# Patient Record
Sex: MALE | Race: BLACK OR AFRICAN AMERICAN
[De-identification: names, ages, dates, MRNs, and addresses within clinical notes are randomized per-mention and may not be internally consistent; named-entity substitution may affect disease eponyms.]

---

## 2017-10-24 NOTE — XCELER[facility] REPORT
[address] [address]

                             Tel: [phone]

                             Fax: [phone]



                    Transthoracic Echocardiogram Report

____________________________________________________________________________



Name: SERENITY DIAZ

MRN: [id number]                Age: 82 yrs

Gender: Male                   : 1935

Patient Status: Outpatient     Patient Location: 

Account #: [id number]

Study Date: 10/23/2017 09:46 AM

Accession #: [id number]

____________________________________________________________________________



Height: 65 in        Weight: 171 lb        BSA: 1.9 m2



____________________________________________________________________________

Procedure: A complete two-dimensional transthoracic echocardiogram was

performed (2D, M-mode, spectral and color flow Doppler). The study was

technically adequate with some images being suboptimal in quality.

Reason For Study: RBBB





Ordering Physician: MONICA URIBE

Performed By: Teresa Thompson

____________________________________________________________________________





Interpretation Summary

The left ventricular ejection fraction is normal.

There is normal left ventricular wall thickness.

The left ventricle is grossly normal size.

Doppler measurements suggest pseudonormalized left ventricular relaxation,

which is associated with grade II/IV or mild to moderate diastolic

dysfunction

The right ventricular systolic function is normal.

The right atrium is normal in size

The left atrial size is normal.

There is a trace to mild amount of mitral regurgitation

There is no mitral valve stenosis.

No aortic regurgitation is present.

There is no aortic valve stenosis

There is a trace or physiologic amount of tricuspid regurgitation

Tricuspid regurgitation jet envelope not well defined to measure RV

systolic pressure accurately.

The aortic root is not well visualized.

The inferior vena cava was not well visualized

There is no pericardial effusion.



____________________________________________________________________________



MMode/2D Measurements & Calculations

RVDd: 3.2 cm  LVIDd: 4.7 cm   FS: 35.8 %            Ao root diam: 2.8 cm

IVSd: 0.86 cm LVIDs: 3.0 cm   EDV(Teich): 103.6 ml

              LVPWd: 0.89 cm  ESV(Teich): 36.0 ml   Ao root area: 6.1 cm2

                              EF(Teich): 65.3 %



Doppler Measurements & Calculations

MV E max vanessa:      MV dec slope:       Ao V2 max:         LV V1 max P.5 cm/sec                            113.7 cm/sec       2.6 mmHg

MV A max vanessa:      202.2 cm/sec2       Ao max PG:         LV V1 max:

72.2 cm/sec        MV dec time:        5.2 mmHg           80.6 cm/sec

MV E/A: 0.60       0.22 sec



        _____________________________________________________________

PA V2 max:         PI end-d vanessa:       TR max vanessa:

78.2 cm/sec        100.0 cm/sec        215.2 cm/sec

PA max PG:                             TR max P.4 mmHg                               18.8 mmHg



____________________________________________________________________________

Left Ventricle

The left ventricle is grossly normal size. There is normal left ventricular

wall thickness. The left ventricular ejection fraction is normal. Doppler

measurements suggest pseudonormalized left ventricular relaxation, which is

associated with grade II/IV or mild to moderate diastolic dysfunction.



Right Ventricle

Borderline right ventricular enlargement. There is normal right ventricular

wall thickness. The right ventricular systolic function is normal.



Atria

The right atrium is normal in size. The left atrial size is normal.

Interarterial septum not well visualized and not well dopplered. Cannot

comment on ASD/PFO presence.





Mitral Valve

The mitral valve is grossly normal. There is no mitral valve stenosis.

There is a trace to mild amount of mitral regurgitation.



Aortic Valve

The aortic valve is grossly normal. There is no aortic valve stenosis. No

aortic regurgitation is present.



Tricuspid Valve

The tricuspid valve is not well visualized, but is grossly normal. There is

no tricuspid stenosis. There is a trace or physiologic amount of tricuspid

regurgitation. Tricuspid regurgitation jet envelope not well defined to

measure RV systolic pressure accurately.



Pulmonic Valve

The pulmonic valve is not well visualized.



Great Vessels

The aortic root is not well visualized. The inferior vena cava was not well

visualized.





Effusions

There is no pericardial effusion.



____________________________________________________________________________



Electronically signed by:      Betty Chavez      on 10/24/2017 08:56 AM



CC: MONICA URIBE

>

Betty Chavez

## 2020-10-22 NOTE — RADIOLOGY REPORT (SQ)
EXAM DESCRIPTION:  HIP LEFT AP/LATERAL



IMAGES COMPLETED DATE/TIME:  10/22/2020 4:24 pm



REASON FOR STUDY:  M25.559 PAIN IN UNSPECIFIED HIP M25.559  PAIN IN UNSPECIFIED HIP M54.10  RADICULOP
ATHY, SITE UNSPECIFIED



COMPARISON:  None.



NUMBER OF VIEWS:  Two views.



TECHNIQUE:  AP pelvis and additional frog legview of the left hip.



LIMITATIONS:  None.



FINDINGS:  MINERALIZATION: Normal.

LEFT HIP: No fracture or dislocation.  No worrisome bone lesions. No contour deformity.  No joint spa
ce narrowing.  Sclerosis with small osteophytes.

RIGHT HIP: No fracture or dislocation.  No worrisome bone lesions.  Sclerosis with small osteophytes.
  Limited views.

PUBIS AND ISCHIUM: No fracture.

PELVIS: No fracture.

SACRUM: No fracture or dislocation. No worrisome bone lesions.

LOWER LUMBAR SPINE: No fracture or dislocation. No worrisome bone lesions.  No significant disc disea
se.

SOFT TISSUES: No findings.

OTHER: No other significant finding.



IMPRESSION:  MILD DEGENERATIVE CHANGES.  NO ACUTE FINDINGS.



TECHNICAL DOCUMENTATION:  JOB ID:  [id number]

 2011 [employer]- All Rights Reserved



Reading location - IP/workstation name: [email or web address]

## 2020-10-22 NOTE — RADIOLOGY REPORT (SQ)
EXAM DESCRIPTION:  L SPINE WHOLE



IMAGES COMPLETED DATE/TIME:  10/22/2020 4:24 pm



REASON FOR STUDY:  M54.10 RADICULOPATHY, SITE UNSPECIFIED M25.559  PAIN IN UNSPECIFIED HIP M54.10  RA
DICULOPATHY, SITE UNSPECIFIED



COMPARISON:  None.



NUMBER OF VIEWS:  Five views including obliques.



TECHNIQUE:  AP, lateral, oblique, and sacral radiographic images acquired of the lumbar spine.



LIMITATIONS:  None.



FINDINGS:  MINERALIZATION: Normal.

SEGMENTATION: Normal.  No transitional anatomy.

ALIGNMENT: Normal.

VERTEBRAE: Maintained height.  No fracture or worrisome bone lesion.

DISCS: Multilevel disc space narrowing with osteophytes.

POSTERIOR ELEMENTS: Pedicles and facets are intact.  No pars defect or posterior arch defects. Facet 
arthropathy is present.

HARDWARE: None in the spine.

PARASPINAL SOFT TISSUES: Normal.

PELVIS: Intact as visualized. No fractures or worrisome bone lesions. SI joints intact.

OTHER: No other significant finding.



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



TECHNICAL DOCUMENTATION:  JOB ID:  [id number]

 2011 [employer]- All Rights Reserved



Reading location - IP/workstation name: [email or web address]

## 2020-12-25 ENCOUNTER — HOSPITAL ENCOUNTER (INPATIENT)
Dept: HOSPITAL 62 - ER | Age: 85
LOS: 13 days | DRG: 208 | End: 2021-01-07
Attending: HOSPITALIST | Admitting: HOSPITALIST
Payer: MEDICARE

## 2020-12-25 DIAGNOSIS — Z79.84: ICD-10-CM

## 2020-12-25 DIAGNOSIS — J12.82: ICD-10-CM

## 2020-12-25 DIAGNOSIS — J96.02: ICD-10-CM

## 2020-12-25 DIAGNOSIS — J96.91: ICD-10-CM

## 2020-12-25 DIAGNOSIS — R65.21: ICD-10-CM

## 2020-12-25 DIAGNOSIS — K72.00: ICD-10-CM

## 2020-12-25 DIAGNOSIS — I48.91: ICD-10-CM

## 2020-12-25 DIAGNOSIS — Z88.0: ICD-10-CM

## 2020-12-25 DIAGNOSIS — Z78.1: ICD-10-CM

## 2020-12-25 DIAGNOSIS — Z79.890: ICD-10-CM

## 2020-12-25 DIAGNOSIS — U07.1: Primary | ICD-10-CM

## 2020-12-25 DIAGNOSIS — A41.89: ICD-10-CM

## 2020-12-25 DIAGNOSIS — E11.9: ICD-10-CM

## 2020-12-25 DIAGNOSIS — Z87.891: ICD-10-CM

## 2020-12-25 DIAGNOSIS — D69.6: ICD-10-CM

## 2020-12-25 DIAGNOSIS — N17.9: ICD-10-CM

## 2020-12-25 DIAGNOSIS — M19.90: ICD-10-CM

## 2020-12-25 DIAGNOSIS — E03.9: ICD-10-CM

## 2020-12-25 LAB
ADD MANUAL DIFF: NO
ALBUMIN SERPL-MCNC: 4.1 G/DL (ref 3.5–5)
ALP SERPL-CCNC: 80 U/L (ref 38–126)
ANION GAP SERPL CALC-SCNC: 10 MMOL/L (ref 5–19)
APPEARANCE UR: CLEAR
APTT PPP: YELLOW S
ARTERIAL BLOOD FIO2: (no result)
ARTERIAL BLOOD H2CO3: 1.03 MMOL/L (ref 1.05–1.35)
ARTERIAL BLOOD HCO3: 22.1 MMOL/L (ref 20–24)
ARTERIAL BLOOD PCO2: 34.1 MMHG (ref 35–45)
ARTERIAL BLOOD PH: 7.43 (ref 7.35–7.45)
ARTERIAL BLOOD PO2: 51.4 MMHG (ref 80–100)
ARTERIAL BLOOD TOTAL CO2: 23.1 MMOL/L (ref 23–27)
AST SERPL-CCNC: 70 U/L (ref 17–59)
BASE EXCESS BLDA CALC-SCNC: -1.6 MMOL/L
BASE EXCESS BLDV CALC-SCNC: -0.6 MMOL/L
BASOPHILS # BLD AUTO: 0 10^3/UL (ref 0–0.2)
BASOPHILS NFR BLD AUTO: 0.4 % (ref 0–2)
BILIRUB DIRECT SERPL-MCNC: 0.1 MG/DL (ref 0–0.4)
BILIRUB SERPL-MCNC: 1.2 MG/DL (ref 0.2–1.3)
BILIRUB UR QL STRIP: NEGATIVE
BUN SERPL-MCNC: 23 MG/DL (ref 7–20)
CALCIUM: 8.8 MG/DL (ref 8.4–10.2)
CHLORIDE SERPL-SCNC: 97 MMOL/L (ref 98–107)
CO2 SERPL-SCNC: 26 MMOL/L (ref 22–30)
EOSINOPHIL # BLD AUTO: 0 10^3/UL (ref 0–0.6)
EOSINOPHIL NFR BLD AUTO: 0 % (ref 0–6)
ERYTHROCYTE [DISTWIDTH] IN BLOOD BY AUTOMATED COUNT: 12.2 % (ref 11.5–14)
FERRITIN SERPL-MCNC: 1050 NG/ML (ref 17.9–464)
GLUCOSE SERPL-MCNC: 140 MG/DL (ref 75–110)
GLUCOSE UR STRIP-MCNC: NEGATIVE MG/DL
HCO3 BLDV-SCNC: 24.6 MMOL/L (ref 20–32)
HCT VFR BLD CALC: 39.9 % (ref 37.9–51)
HGB BLD-MCNC: 12.8 G/DL (ref 13.5–17)
KETONES UR STRIP-MCNC: NEGATIVE MG/DL
LYMPHOCYTES # BLD AUTO: 1.1 10^3/UL (ref 0.5–4.7)
LYMPHOCYTES NFR BLD AUTO: 11.8 % (ref 13–45)
MCH RBC QN AUTO: 28.8 PG (ref 27–33.4)
MCHC RBC AUTO-ENTMCNC: 32.1 G/DL (ref 32–36)
MCV RBC AUTO: 90 FL (ref 80–97)
MONOCYTES # BLD AUTO: 0.9 10^3/UL (ref 0.1–1.4)
MONOCYTES NFR BLD AUTO: 9.2 % (ref 3–13)
NEUTROPHILS # BLD AUTO: 7.4 10^3/UL (ref 1.7–8.2)
NEUTS SEG NFR BLD AUTO: 78.6 % (ref 42–78)
NITRITE UR QL STRIP: NEGATIVE
PCO2 BLDV: 42.6 MMHG (ref 35–63)
PH BLDV: 7.38 [PH] (ref 7.3–7.42)
PH UR STRIP: 6 [PH] (ref 5–9)
PLATELET # BLD: 230 10^3/UL (ref 150–450)
POTASSIUM SERPL-SCNC: 4.4 MMOL/L (ref 3.6–5)
PROT SERPL-MCNC: 7.8 G/DL (ref 6.3–8.2)
PROT UR STRIP-MCNC: 30 MG/DL
RBC # BLD AUTO: 4.44 10^6/UL (ref 4.35–5.55)
SAO2 % BLDA: 87.7 % (ref 94–98)
SP GR UR STRIP: 1.01
TOTAL CELLS COUNTED % (AUTO): 100 %
UROBILINOGEN UR-MCNC: NEGATIVE MG/DL (ref ?–2)
WBC # BLD AUTO: 9.4 10^3/UL (ref 4–10.5)

## 2020-12-25 PROCEDURE — 87070 CULTURE OTHR SPECIMN AEROBIC: CPT

## 2020-12-25 PROCEDURE — 86140 C-REACTIVE PROTEIN: CPT

## 2020-12-25 PROCEDURE — 99285 EMERGENCY DEPT VISIT HI MDM: CPT

## 2020-12-25 PROCEDURE — 71045 X-RAY EXAM CHEST 1 VIEW: CPT

## 2020-12-25 PROCEDURE — C9113 INJ PANTOPRAZOLE SODIUM, VIA: HCPCS

## 2020-12-25 PROCEDURE — 94660 CPAP INITIATION&MGMT: CPT

## 2020-12-25 PROCEDURE — 36573 INSJ PICC RS&I 5 YR+: CPT

## 2020-12-25 PROCEDURE — 84484 ASSAY OF TROPONIN QUANT: CPT

## 2020-12-25 PROCEDURE — 87205 SMEAR GRAM STAIN: CPT

## 2020-12-25 PROCEDURE — 87150 DNA/RNA AMPLIFIED PROBE: CPT

## 2020-12-25 PROCEDURE — 87186 SC STD MICRODIL/AGAR DIL: CPT

## 2020-12-25 PROCEDURE — 83615 LACTATE (LD) (LDH) ENZYME: CPT

## 2020-12-25 PROCEDURE — 84134 ASSAY OF PREALBUMIN: CPT

## 2020-12-25 PROCEDURE — 82803 BLOOD GASES ANY COMBINATION: CPT

## 2020-12-25 PROCEDURE — 85384 FIBRINOGEN ACTIVITY: CPT

## 2020-12-25 PROCEDURE — 92950 HEART/LUNG RESUSCITATION CPR: CPT

## 2020-12-25 PROCEDURE — 82962 GLUCOSE BLOOD TEST: CPT

## 2020-12-25 PROCEDURE — 84478 ASSAY OF TRIGLYCERIDES: CPT

## 2020-12-25 PROCEDURE — C9803 HOPD COVID-19 SPEC COLLECT: HCPCS

## 2020-12-25 PROCEDURE — 81001 URINALYSIS AUTO W/SCOPE: CPT

## 2020-12-25 PROCEDURE — 99233 SBSQ HOSP IP/OBS HIGH 50: CPT

## 2020-12-25 PROCEDURE — 80048 BASIC METABOLIC PNL TOTAL CA: CPT

## 2020-12-25 PROCEDURE — 93005 ELECTROCARDIOGRAM TRACING: CPT

## 2020-12-25 PROCEDURE — 82570 ASSAY OF URINE CREATININE: CPT

## 2020-12-25 PROCEDURE — 85025 COMPLETE CBC W/AUTO DIFF WBC: CPT

## 2020-12-25 PROCEDURE — 94002 VENT MGMT INPAT INIT DAY: CPT

## 2020-12-25 PROCEDURE — 80053 COMPREHEN METABOLIC PANEL: CPT

## 2020-12-25 PROCEDURE — 85610 PROTHROMBIN TIME: CPT

## 2020-12-25 PROCEDURE — 85730 THROMBOPLASTIN TIME PARTIAL: CPT

## 2020-12-25 PROCEDURE — 93010 ELECTROCARDIOGRAM REPORT: CPT

## 2020-12-25 PROCEDURE — 85652 RBC SED RATE AUTOMATED: CPT

## 2020-12-25 PROCEDURE — 83880 ASSAY OF NATRIURETIC PEPTIDE: CPT

## 2020-12-25 PROCEDURE — 85379 FIBRIN DEGRADATION QUANT: CPT

## 2020-12-25 PROCEDURE — 80061 LIPID PANEL: CPT

## 2020-12-25 PROCEDURE — 36620 INSERTION CATHETER ARTERY: CPT

## 2020-12-25 PROCEDURE — 36600 WITHDRAWAL OF ARTERIAL BLOOD: CPT

## 2020-12-25 PROCEDURE — 87040 BLOOD CULTURE FOR BACTERIA: CPT

## 2020-12-25 PROCEDURE — 96361 HYDRATE IV INFUSION ADD-ON: CPT

## 2020-12-25 PROCEDURE — 76937 US GUIDE VASCULAR ACCESS: CPT

## 2020-12-25 PROCEDURE — 77001 FLUOROGUIDE FOR VEIN DEVICE: CPT

## 2020-12-25 PROCEDURE — 87086 URINE CULTURE/COLONY COUNT: CPT

## 2020-12-25 PROCEDURE — 99291 CRITICAL CARE FIRST HOUR: CPT

## 2020-12-25 PROCEDURE — 94003 VENT MGMT INPAT SUBQ DAY: CPT

## 2020-12-25 PROCEDURE — P9047 ALBUMIN (HUMAN), 25%, 50ML: HCPCS

## 2020-12-25 PROCEDURE — 87077 CULTURE AEROBIC IDENTIFY: CPT

## 2020-12-25 PROCEDURE — 31500 INSERT EMERGENCY AIRWAY: CPT

## 2020-12-25 PROCEDURE — 96374 THER/PROPH/DIAG INJ IV PUSH: CPT

## 2020-12-25 PROCEDURE — 84100 ASSAY OF PHOSPHORUS: CPT

## 2020-12-25 PROCEDURE — 83735 ASSAY OF MAGNESIUM: CPT

## 2020-12-25 PROCEDURE — 85027 COMPLETE CBC AUTOMATED: CPT

## 2020-12-25 PROCEDURE — 83605 ASSAY OF LACTIC ACID: CPT

## 2020-12-25 PROCEDURE — 82728 ASSAY OF FERRITIN: CPT

## 2020-12-25 PROCEDURE — 84300 ASSAY OF URINE SODIUM: CPT

## 2020-12-25 PROCEDURE — 36415 COLL VENOUS BLD VENIPUNCTURE: CPT

## 2020-12-25 RX ADMIN — VITAMIN D, TAB 1000IU (100/BT) SCH UNIT: 25 TAB at 22:48

## 2020-12-25 RX ADMIN — Medication SCH MG: at 22:47

## 2020-12-25 RX ADMIN — Medication SCH MG: at 22:46

## 2020-12-25 RX ADMIN — INSULIN LISPRO SCH UNIT: 100 INJECTION, SOLUTION INTRAVENOUS; SUBCUTANEOUS at 23:04

## 2020-12-25 RX ADMIN — Medication SCH TAB: at 23:23

## 2020-12-25 NOTE — ER DOCUMENT REPORT
ED Respiratory Problem





- General


Chief Complaint: Shortness Of Breath


Stated Complaint: SHORT OF BREATH


Time Seen by Provider: 12/25/20 15:32


Mode of Arrival: Medic


Information source: Patient


Notes: 





85-year-old man presenting to the emergency department history of diagnosed with

coronavirus on Tuesday of this past week 12/22/2020.  He complains of increasing

weakness over the past week along with loss of smell and taste.  He is a poor 

intake as well as increasing cough and over the past 24 hours shortness of 

breath.  His granddaughter is also positive for coronavirus.  History of diabet

es mellitus


TRAVEL OUTSIDE OF THE [place] IN LAST 30 DAYS: No





- Related Data


Allergies/Adverse Reactions: 


                                        





Penicillins Allergy (Intermediate, Verified 12/28/17 14:33)


   Hives











Past Medical History





- General


Information source: Patient





- Social History


Smoking Status: Unknown if Ever Smoked


Family History: Reviewed & Not Pertinent





- Past Medical History


Cardiac Medical History: 


   Denies: Hx Heart Attack, Hx Hypertension


Pulmonary Medical History: 


   Denies: Hx Asthma


Neurological Medical History: Denies: Hx Cerebrovascular Accident, Hx Seizures


GI Medical History: Denies: Hx Hepatitis, Hx Hiatal Hernia, Hx Ulcer


Infectious Medical History: Denies: Hx Hepatitis


Past Surgical History: Denies: Hx Open Heart Surgery, Hx Pacemaker





Review of Systems





- Review of Systems


Notes: 


Constitutional: Negative for fever.


HENT: Negative for sore throat.


Eyes: Negative for visual changes.


Cardiovascular: See HPI


Respiratory: Negative for shortness of breath.


Gastrointestinal: Negative for abdominal pain, vomiting or diarrhea.


Genitourinary: Negative for dysuria.


Musculoskeletal: Negative for back pain.


Skin: Negative for rash.


Neurological: Negative for headaches, weakness or numbness.





10 point ROS negative except as marked above and in HPI.








Physical Exam





- Vital signs


Vitals: 


                                        











Pulse Ox


 


 86 L


 


 12/25/20 15:03














- Notes


Notes: 





PHYSICAL EXAMINATION:


 


Physical Exam:


General: Well-nourished well-developed 85-year-old male in with shortness of 

breath


HEENT: NC/AT, pupils equal round and reactive to light, MM moist,nares clear, 

oropharynx clear, airway patent


Neck: supple, no adenopathy, no masses.  Good range of motion


Lungs: clear, no wheezing, no rales no rhonchi


CVS: Regular rate and rhythm no murmur gallop or rub


Abdomen: Soft, active, nontender, no masses, no hepatosplenomegaly


Ext:   No edema, clubbing or cyanosis.


Neuro: Alert and responsive, moving all 4 extremities on command, cranial nerves

intact, no focal findings


Skin: Intact no open lesions, no rash











Course





- Re-evaluation


Re-evalutation: 





12/25/20 18:17


Patient with known coronavirus 19 positive outpatient tests Tuesday, 12/22/2020.

 Presents with pneumonia and hypoxia.  He is being admitted to the hospital, jamey day has an allergy to penicillin states he had a rash many years ago.  I am 

giving him ceftriaxone 1 g IV along with Zithromax 100 mg IV and Decadron 6 mg 

IV.  I have discussed the patient with the hospitalist and Dr. Gonzales will 

await the patient to the hospital for further evaluation and treatment.





- Vital Signs


Vital signs: 


                                        











Temp Pulse Resp BP Pulse Ox


 


 101.1 F H     23 H  134/79 H  91 L


 


 12/25/20 16:00     12/25/20 16:00  12/25/20 16:00  12/25/20 16:00














- Laboratory Results


Result Diagrams: 


                                 12/25/20 15:16





                                 12/25/20 15:16


Laboratory Results Interpreted: 


                                        











  12/25/20 12/25/20 12/25/20





  15:16 15:16 15:20


 


Hgb  12.8 L  


 


Lymph % (Auto)  11.8 L  


 


Seg Neutrophils %  78.6 H  


 


ESR   


 


D-Dimer   


 


Carbonic Acid    1.03 L


 


ABG pCO2    34.1 L


 


ABG pO2    51.4 L


 


ABG O2 Saturation    87.7 L


 


Sodium   132.7 L 


 


Chloride   97 L 


 


BUN   23 H 


 


Creatinine   1.50 H 


 


Est GFR ( Amer)   54 L 


 


Est GFR (MDRD) Non-Af   45 L 


 


Glucose   140 H 


 


Ferritin   


 


AST   70 H 


 


Lactate Dehydrogenase   


 


C-Reactive Protein   


 


Urine Protein   


 


Urine Ascorbic Acid   














  12/25/20 12/25/20 12/25/20





  15:43 15:43 15:43


 


Hgb   


 


Lymph % (Auto)   


 


Seg Neutrophils %   


 


ESR  60 H  


 


D-Dimer    1.44 H


 


Carbonic Acid   


 


ABG pCO2   


 


ABG pO2   


 


ABG O2 Saturation   


 


Sodium   


 


Chloride   


 


BUN   


 


Creatinine   


 


Est GFR ( Amer)   


 


Est GFR (MDRD) Non-Af   


 


Glucose   


 


Ferritin   


 


AST   


 


Lactate Dehydrogenase   


 


C-Reactive Protein   36.7 H 


 


Urine Protein   


 


Urine Ascorbic Acid   














  12/25/20 12/25/20





  15:43 16:30


 


Hgb  


 


Lymph % (Auto)  


 


Seg Neutrophils %  


 


ESR  


 


D-Dimer  


 


Carbonic Acid  


 


ABG pCO2  


 


ABG pO2  


 


ABG O2 Saturation  


 


Sodium  


 


Chloride  


 


BUN  


 


Creatinine  


 


Est GFR ( Amer)  


 


Est GFR (MDRD) Non-Af  


 


Glucose  


 


Ferritin  1050.00 H 


 


AST  


 


Lactate Dehydrogenase  467 H 


 


C-Reactive Protein  


 


Urine Protein   30 H


 


Urine Ascorbic Acid   40 H








I have reviewed laboratory data and used this information for the treatment 

decisions regarding the patient.





Critical Laboratory Results Reviewed: No Critical Results





- Radiology Results


Radiology Results Interpreted: 





12/25/20 17:55





                                        





Chest X-Ray  12/25/20 15:03


IMPRESSION:  Cannot exclude a limited left lower lobe pneumonia.


 











Critical Radiology Results Reviewed: No Critical Results





- EKG Interpretation by Me


Rate: Normal - EKG interpreted by Dr. Villanueva: Normal sinus rhythm, rate 99, HI 

interval 124 ms QT interval 368 ms, left axis deviation, right bundle branch 

block, no acute ST or T wave abnormalities, , there is no prior EKG for 

comparison.  Interpretation: Abnormal EKG





Discharge





- Discharge


Clinical Impression: 


 SARS-associated coronavirus infection, Hypoxia





Pneumonia


Qualifiers:


 Pneumonia type: due to unspecified organism Laterality: left Lung location: 

lower lobe of lung Qualified Code(s): J18.9 - Pneumonia, unspecified organism





Condition: Good


Disposition: ADMITTED AS INPATIENT


Admitting Provider: Janet (Hospitalist)


Unit Admitted: [facility]

## 2020-12-25 NOTE — EKG REPORT
SEVERITY:- ABNORMAL ECG -

SINUS RHYTHM

RIGHT BUNDLE BRANCH BLOCK

:

Confirmed by: Patrick Stone MD 25-Dec-2020 21:49:19

## 2020-12-25 NOTE — RADIOLOGY REPORT (SQ)
EXAM DESCRIPTION:  CHEST SINGLE VIEW



IMAGES COMPLETED DATE/TIME:  12/25/2020 3:16 pm



REASON FOR STUDY:  shortness of breath



COMPARISON:  None.



EXAM PARAMETERS:  NUMBER OF VIEWS: One view.

TECHNIQUE: Single frontal radiographic view of the chest acquired.

RADIATION DOSE: NA

LIMITATIONS: None.



FINDINGS:  LUNGS AND PLEURA: There is ill-defined opacification in the left lower lung field.  No foc
al consolidation.

MEDIASTINUM AND HILAR STRUCTURES: No masses.  Contour normal.

HEART AND VASCULAR STRUCTURES: Heart normal in size.  Normal vasculature.

BONES: No acute findings.

HARDWARE: None in the chest.

OTHER: No other significant finding.



IMPRESSION:  Cannot exclude a limited left lower lobe pneumonia.



TECHNICAL DOCUMENTATION:  JOB ID:  [id number]

 2011 [employer]- All Rights Reserved



Reading location - IP/workstation name: [email or web address]

## 2020-12-26 PROCEDURE — XW033E5 INTRODUCTION OF REMDESIVIR ANTI-INFECTIVE INTO PERIPHERAL VEIN, PERCUTANEOUS APPROACH, NEW TECHNOLOGY GROUP 5: ICD-10-PCS | Performed by: RADIOLOGY

## 2020-12-26 RX ADMIN — VITAMIN D, TAB 1000IU (100/BT) SCH UNIT: 25 TAB at 12:00

## 2020-12-26 RX ADMIN — INSULIN LISPRO SCH UNIT: 100 INJECTION, SOLUTION INTRAVENOUS; SUBCUTANEOUS at 18:11

## 2020-12-26 RX ADMIN — INSULIN LISPRO SCH UNIT: 100 INJECTION, SOLUTION INTRAVENOUS; SUBCUTANEOUS at 07:39

## 2020-12-26 RX ADMIN — AZTREONAM SCH MLS/HR: 1 INJECTION, POWDER, LYOPHILIZED, FOR SOLUTION INTRAMUSCULAR; INTRAVENOUS at 18:09

## 2020-12-26 RX ADMIN — OXYCODONE HYDROCHLORIDE AND ACETAMINOPHEN SCH MG: 500 TABLET ORAL at 18:10

## 2020-12-26 RX ADMIN — AZITHROMYCIN MONOHYDRATE SCH MLS/HR: 500 INJECTION, POWDER, LYOPHILIZED, FOR SOLUTION INTRAVENOUS at 18:00

## 2020-12-26 RX ADMIN — INSULIN LISPRO SCH UNIT: 100 INJECTION, SOLUTION INTRAVENOUS; SUBCUTANEOUS at 21:43

## 2020-12-26 RX ADMIN — DEXAMETHASONE SODIUM PHOSPHATE SCH MG: 10 INJECTION INTRAMUSCULAR; INTRAVENOUS at 12:02

## 2020-12-26 RX ADMIN — Medication SCH TAB: at 12:01

## 2020-12-26 RX ADMIN — ENOXAPARIN SODIUM SCH MG: 40 INJECTION SUBCUTANEOUS at 12:01

## 2020-12-26 RX ADMIN — OXYCODONE HYDROCHLORIDE AND ACETAMINOPHEN SCH MG: 500 TABLET ORAL at 12:22

## 2020-12-26 RX ADMIN — INSULIN LISPRO SCH: 100 INJECTION, SOLUTION INTRAVENOUS; SUBCUTANEOUS at 12:22

## 2020-12-26 RX ADMIN — OXYCODONE HYDROCHLORIDE AND ACETAMINOPHEN SCH MG: 500 TABLET ORAL at 05:49

## 2020-12-26 RX ADMIN — SODIUM CHLORIDE PRN MLS/HR: 9 INJECTION, SOLUTION INTRAVENOUS at 07:39

## 2020-12-26 RX ADMIN — AZTREONAM SCH MLS/HR: 1 INJECTION, POWDER, LYOPHILIZED, FOR SOLUTION INTRAMUSCULAR; INTRAVENOUS at 13:02

## 2020-12-26 RX ADMIN — OXYCODONE HYDROCHLORIDE AND ACETAMINOPHEN SCH: 500 TABLET ORAL at 00:12

## 2020-12-26 RX ADMIN — Medication SCH MG: at 21:14

## 2020-12-26 RX ADMIN — Medication SCH MG: at 12:01

## 2020-12-26 RX ADMIN — AZTREONAM SCH MLS/HR: 1 INJECTION, POWDER, LYOPHILIZED, FOR SOLUTION INTRAMUSCULAR; INTRAVENOUS at 01:57

## 2020-12-26 RX ADMIN — SODIUM CHLORIDE PRN MLS/HR: 9 INJECTION, SOLUTION INTRAVENOUS at 19:00

## 2020-12-26 NOTE — PDOC H&P
History of Present Illness


Admission Date/PCP: 


  12/25/20 18:17





  MONICA URIBE MD





Patient complains of: Difficulty with breathing


History of Present Illness: 


SERENITY DIAZ is a 85 year old male patient of Dr. Uribe who presented to the

ED with recent diagnosis of positive status for COVID-19 infection and worsening

shortness of breath, particularly with exertion. Patient reported associated 

minimally productive cough, loss of smell and taste over last one week. He 

claimed associated worsening generalized weakness. He admitted exposure to his 

granddaughter was was recently diagnosed with corona virus infection. He denied 

any diarrhea or abdominal pain. No chest pain or palpitation. His initial ED 

evaluation was significant for elevation of inflammatory indices and D-Dimer, 

electrolytes derangement, and chest X ray suggestive of left lower lobe airspace

disease process. He was advised hospitalization for further evaluation and 

management. His morbidities are as listed below.








Past Medical History


Cardiac Medical History: 


   Denies: Myocardial Infarction, Hypertension


Pulmonary Medical History: 


   Denies: Asthma


Neurological Medical History: 


   Denies: Seizures


Endocrine Medical History: Reports: Diabetes Mellitus Type 2, Hypothyroidism


GI Medical History: 


   Denies: Hepatitis, Hiatal Hernia


Musculoskeltal Medical History: Reports: Arthritis


Hematology: 


   Denies: Anemia, Sickle Cell Disease





Past Surgical History


Past Surgical History: 


   Denies: Pacemaker





Social History


Smoking Status: Unknown if Ever Smoked





- Advance Directive


Resuscitation Status: Full Code





Family History


Family History: Reviewed & Not Pertinent


Parental Family History Reviewed: Yes


Children Family History Reviewed: Yes


Sibling(s) Family History Reviewed.: Yes





Medication/Allergy


Home Medications: 








Aspirin [Aspirin EC] 81 mg PO DAILY 12/28/17 


Besifloxacin HCl [Besivance 0.6% Oph Susp 5 ml] 1 drop OP ASDIR 12/28/17 


Cholecalciferol (Vitamin D3) [Vitamin D3 1000 Unit Tablet] 1,000 unit PO DAILY 

12/28/17 


Difluprednate [Durezol] 1 drop OP ASDIR 12/28/17 


Glucosamine Sulfate Dipot Chlr [Glucosamine] 1,000 mg PO DAILY 12/28/17 


Glyburide 2.5 mg PO BID 12/28/17 


Ibuprofen [Advil] 200 mg PO Q4HP PRN 12/28/17 


Levothyroxine Sodium [Synthroid 0.025 mg Tablet] 25 mcg PO QAM 12/28/17 


Multivitamin [Multivitamins] 1 each PO DAILY 12/28/17 


Nepafenac [Ilevro] 1 drop OP ASDIR 12/28/17 


Omega-3 Fatty Acids/Fish Oil [Fish Oil 1,000 mg Capsule] 1 each PO DAILY 

12/28/17 


Psyllium Husk [Metamucil] 660 gm PO DAILY 12/28/17 








Allergies/Adverse Reactions: 


                                        





Penicillins Allergy (Intermediate, Verified 12/28/17 14:33)


   Hives











Review of Systems


Constitutional: PRESENT: fatigue, weakness.  ABSENT: chills, fever(s), 

headache(s)


Eyes: ABSENT: visual disturbances


Ears: ABSENT: hearing changes


Cardiovascular: PRESENT: dyspnea on exertion.  ABSENT: chest pain, edema, orth

ropnea, palpitations


Respiratory: PRESENT: cough, sputum.  ABSENT: hemoptysis


Gastrointestinal: PRESENT: other - loss of smell and taste.  ABSENT: abdominal 

pain, constipation, diarrhea, hematemesis, hematochezia, nausea, vomiting


Genitourinary: ABSENT: dysuria, hematuria


Musculoskeletal: ABSENT: joint swelling


Integumentary: ABSENT: rash, wounds


Neurological: ABSENT: abnormal gait, abnormal speech, confusion, dizziness, 

focal weakness, syncope


Psychiatric: ABSENT: anxiety, depression, homidical ideation, suicidal ideation


Endocrine: ABSENT: cold intolerance, heat intolerance, polydipsia, polyuria


Hematologic/Lymphatic: ABSENT: easy bleeding, easy bruising, lymphadenopathy


Allergic/Immunologic: ABSENT: seasonal rhinorrhea





Physical Exam


Vital Signs: 


                                        











Temp Pulse Resp BP Pulse Ox


 


 101.1 F H     23 H  134/79 H  91 L


 


 12/25/20 16:00     12/25/20 16:00  12/25/20 16:00  12/25/20 16:00








                                 Intake & Output











 12/24/20 12/25/20 12/26/20





 06:59 06:59 06:59


 


Intake Total   1000


 


Balance   1000


 


Weight   77.111 kg











General appearance: PRESENT: mild distress - on nonrebreathing face mask 

supplemental oxygen


Head exam: PRESENT: atraumatic, normocephalic


Eye exam: PRESENT: conjunctiva pink, EOMI, PERRLA.  ABSENT: scleral icterus


Ear exam: PRESENT: normal external ear exam


Mouth exam: PRESENT: moist


Neck exam: PRESENT: full ROM.  ABSENT: carotid bruit, JVD, lymphadenopathy, 

thyromegaly


Respiratory exam: PRESENT: clear to auscultation iris, decreased breath sounds - 

at lung bases


Cardiovascular exam: PRESENT: RRR, +S1, +S2.  ABSENT: diastolic murmur, rubs, 

systolic murmur


Vascular exam: PRESENT: normal capillary refill.  ABSENT: pallor


GI/Abdominal exam: PRESENT: normal bowel sounds, soft.  ABSENT: distended, 

guarding, mass, organolmegaly, rebound, tenderness


Rectal exam: PRESENT: deferred


Extremities exam: ABSENT: pedal edema


Neurological exam: PRESENT: alert, awake, oriented to person, oriented to place,

 oriented to time, oriented to situation, CN II-XII grossly intact.  ABSENT: 

motor sensory deficit


Psychiatric exam: PRESENT: appropriate affect, normal mood.  ABSENT: homicidal 

ideation, suicidal ideation


Skin exam: PRESENT: dry, intact, warm.  ABSENT: cyanosis, rash





Results


Laboratory Results: 


                                        





                                 12/25/20 15:16 





                                 12/25/20 15:16 





                                        











  12/25/20 12/25/20 12/25/20





  15:16 15:16 15:16


 


WBC  9.4  


 


RBC  4.44  


 


Hgb  12.8 L  


 


Hct  39.9  


 


MCV  90  


 


MCH  28.8  


 


MCHC  32.1  


 


RDW  12.2  


 


Plt Count  230  


 


Seg Neutrophils %  78.6 H  


 


Carbonic Acid   


 


HCO3/H2CO3 Ratio   


 


ABG pH   


 


ABG pCO2   


 


ABG pO2   


 


ABG HCO3   


 


ABG O2 Saturation   


 


ABG Base Excess   


 


VBG pH    7.38


 


VBG pCO2    42.6


 


VBG HCO3    24.6


 


VBG Base Excess    -0.6


 


FiO2   


 


Sodium   132.7 L 


 


Potassium   4.4 


 


Chloride   97 L 


 


Carbon Dioxide   26 


 


Anion Gap   10 


 


BUN   23 H 


 


Creatinine   1.50 H 


 


Est GFR ( Amer)   54 L 


 


Glucose   140 H 


 


Calcium   8.8 


 


Ferritin   


 


Total Bilirubin   1.2 


 


AST   70 H 


 


Alkaline Phosphatase   80 


 


C-Reactive Protein   


 


Total Protein   7.8 


 


Albumin   4.1 


 


Urine Color   


 


Urine Appearance   


 


Urine pH   


 


Ur Specific Gravity   


 


Urine Protein   


 


Urine Glucose (UA)   


 


Urine Ketones   


 


Urine Blood   


 


Urine Nitrite   


 


Ur Leukocyte Esterase   


 


Urine WBC (Auto)   


 


Urine RBC (Auto)   














  12/25/20 12/25/20 12/25/20





  15:20 15:43 15:43


 


WBC   


 


RBC   


 


Hgb   


 


Hct   


 


MCV   


 


MCH   


 


MCHC   


 


RDW   


 


Plt Count   


 


Seg Neutrophils %   


 


Carbonic Acid  1.03 L  


 


HCO3/H2CO3 Ratio  21:1  


 


ABG pH  7.43  


 


ABG pCO2  34.1 L  


 


ABG pO2  51.4 L  


 


ABG HCO3  22.1  


 


ABG O2 Saturation  87.7 L  


 


ABG Base Excess  -1.6  


 


VBG pH   


 


VBG pCO2   


 


VBG HCO3   


 


VBG Base Excess   


 


FiO2  6L  


 


Sodium   


 


Potassium   


 


Chloride   


 


Carbon Dioxide   


 


Anion Gap   


 


BUN   


 


Creatinine   


 


Est GFR (African Amer)   


 


Glucose   


 


Calcium   


 


Ferritin    1050.00 H


 


Total Bilirubin   


 


AST   


 


Alkaline Phosphatase   


 


C-Reactive Protein   36.7 H 


 


Total Protein   


 


Albumin   


 


Urine Color   


 


Urine Appearance   


 


Urine pH   


 


Ur Specific Gravity   


 


Urine Protein   


 


Urine Glucose (UA)   


 


Urine Ketones   


 


Urine Blood   


 


Urine Nitrite   


 


Ur Leukocyte Esterase   


 


Urine WBC (Auto)   


 


Urine RBC (Auto)   














  12/25/20





  16:30


 


WBC 


 


RBC 


 


Hgb 


 


Hct 


 


MCV 


 


MCH 


 


MCHC 


 


RDW 


 


Plt Count 


 


Seg Neutrophils % 


 


Carbonic Acid 


 


HCO3/H2CO3 Ratio 


 


ABG pH 


 


ABG pCO2 


 


ABG pO2 


 


ABG HCO3 


 


ABG O2 Saturation 


 


ABG Base Excess 


 


VBG pH 


 


VBG pCO2 


 


VBG HCO3 


 


VBG Base Excess 


 


FiO2 


 


Sodium 


 


Potassium 


 


Chloride 


 


Carbon Dioxide 


 


Anion Gap 


 


BUN 


 


Creatinine 


 


Est GFR (African Amer) 


 


Glucose 


 


Calcium 


 


Ferritin 


 


Total Bilirubin 


 


AST 


 


Alkaline Phosphatase 


 


C-Reactive Protein 


 


Total Protein 


 


Albumin 


 


Urine Color  YELLOW


 


Urine Appearance  CLEAR


 


Urine pH  6.0


 


Ur Specific [place]  1.011


 


Urine Protein  30 H


 


Urine Glucose (UA)  NEGATIVE


 


Urine Ketones  NEGATIVE


 


Urine Blood  NEGATIVE


 


Urine Nitrite  NEGATIVE


 


Ur Leukocyte Esterase  NEGATIVE


 


Urine WBC (Auto)  1


 


Urine RBC (Auto)  1











Impressions: 


                                        





Chest X-Ray  12/25/20 15:03


IMPRESSION:  Cannot exclude a limited left lower lobe pneumonia.


 














Assessment & Plan





- Diagnosis


(1) SARS-associated coronavirus infection


Is this a current diagnosis for this admission?: Yes   


Plan: 


See covering admitting attending physician orders for details about care plan.








(2) Pneumonia


Qualifiers: 


   Pneumonia type: due to unspecified organism   Laterality: left   Lung 

location: lower lobe of lung   Qualified Code(s): J18.9 - Pneumonia, unspecified

 organism   


Is this a current diagnosis for this admission?: Yes   


Plan: 


See covering admitting attending physician orders for details about care plan.








(3) Hypoxia


Is this a current diagnosis for this admission?: Yes   


Plan: 


See covering admitting attending physician orders for details about care plan.








(4) Diabetes mellitus type 2 in nonobese


Is this a current diagnosis for this admission?: Yes   


Plan: 


See covering admitting attending physician orders for details about care plan.








(5) Hypothyroidism


Qualifiers: 


   Hypothyroidism type: unspecified   Qualified Code(s): E03.9 - Hypothyroidism,

 unspecified   


Is this a current diagnosis for this admission?: Yes   


Plan: 


See covering admitting attending physician orders for details about care plan.








- Time


Time Spent: 50 to 70 Minutes


Medications reviewed and adjusted accordingly: Yes


Anticipated Discharge Disposition: Home with Home Health


Anticipated Discharge Timeframe: within 72 hours





- Inpatient Certification


Based on my medical assessment, after consideration of the patient's 

comorbidities, presenting symptoms, or acuity I expect that the services needed 

warrant INPATIENT care.: Yes


I certify that my determination is in accordance with my understanding of 

Medicare's requirements for reasonable and necessary INPATIENT services [42 CFR 

412.3e].: Yes


Medical Necessity: Significant Comorbidiites Make Outpatient Treatment Too 

Risky, Need Close Monitoring Due to Risk of Patient Decompensation, Need For IV 

Fluids, Need For Continuous Telemetry Monitoring, Need for IV Antibiotics, Risk 

of Complication if Not Cared For in Hospital, Risk of Diagnosis Which Will 

Require Inpatient Eval/Care/Monitoring


Post Hospital Care: D/C Planner Documentation





- Plan Summary


Plan Summary: 





See covering admitting attending physician orders for details about care plan.

## 2020-12-26 NOTE — PDOC PROGRESS REPORT
Subjective


Date:: 12/26/20


Subjective:: 





Patient continue to demonstrate hypoxemia with exertion. No chest pain. No abdom

inal pain, diarrhea, nausea or vomiting.


Reason For Visit: 


COVID-19 PNEUMONIA; DM TYPE 2; HYPOTHRYOIDISM








Physical Exam


Vital Signs: 


                                        











Temp Pulse Resp BP Pulse Ox


 


 101.1 F H     17   129/71 H  86 L


 


 12/25/20 16:00     12/26/20 06:30  12/26/20 06:30  12/26/20 06:30








                                 Intake & Output











 12/25/20 12/26/20 12/27/20





 06:59 06:59 06:59


 


Intake Total  1000 


 


Balance  1000 


 


Weight  77.111 kg 











General appearance: PRESENT: mild distress


Head exam: PRESENT: atraumatic, normocephalic


Eye exam: PRESENT: conjunctiva pink.  ABSENT: scleral icterus


Mouth exam: PRESENT: moist


Respiratory exam: PRESENT: clear to auscultation iris, crackles - at lung bases, 

decreased breath sounds - at lung bases


Cardiovascular exam: PRESENT: RRR, +S1, +S2.  ABSENT: diastolic murmur, systolic

murmur


Vascular exam: ABSENT: pallor


GI/Abdominal exam: PRESENT: normal bowel sounds, soft.  ABSENT: tenderness


Extremities exam: ABSENT: pedal edema


Neurological exam: PRESENT: alert, awake


Psychiatric exam: ABSENT: agitated, anxious


Skin exam: PRESENT: dry, warm





Results


Laboratory Results: 


                                        





                                 12/25/20 15:16 





                                 12/25/20 15:16 





                                        











  12/25/20 12/25/20 12/25/20





  15:16 15:16 15:16


 


WBC  9.4  


 


RBC  4.44  


 


Hgb  12.8 L  


 


Hct  39.9  


 


MCV  90  


 


MCH  28.8  


 


MCHC  32.1  


 


RDW  12.2  


 


Plt Count  230  


 


Seg Neutrophils %  78.6 H  


 


Carbonic Acid   


 


HCO3/H2CO3 Ratio   


 


ABG pH   


 


ABG pCO2   


 


ABG pO2   


 


ABG HCO3   


 


ABG O2 Saturation   


 


ABG Base Excess   


 


VBG pH    7.38


 


VBG pCO2    42.6


 


VBG HCO3    24.6


 


VBG Base Excess    -0.6


 


FiO2   


 


Sodium   132.7 L 


 


Potassium   4.4 


 


Chloride   97 L 


 


Carbon Dioxide   26 


 


Anion Gap   10 


 


BUN   23 H 


 


Creatinine   1.50 H 


 


Est GFR ( Amer)   54 L 


 


Glucose   140 H 


 


Calcium   8.8 


 


Ferritin   


 


Total Bilirubin   1.2 


 


AST   70 H 


 


Alkaline Phosphatase   80 


 


C-Reactive Protein   


 


Total Protein   7.8 


 


Albumin   4.1 


 


Urine Color   


 


Urine Appearance   


 


Urine pH   


 


Ur Specific Gravity   


 


Urine Protein   


 


Urine Glucose (UA)   


 


Urine Ketones   


 


Urine Blood   


 


Urine Nitrite   


 


Ur Leukocyte Esterase   


 


Urine WBC (Auto)   


 


Urine RBC (Auto)   














  12/25/20 12/25/20 12/25/20





  15:20 15:43 15:43


 


WBC   


 


RBC   


 


Hgb   


 


Hct   


 


MCV   


 


MCH   


 


MCHC   


 


RDW   


 


Plt Count   


 


Seg Neutrophils %   


 


Carbonic Acid  1.03 L  


 


HCO3/H2CO3 Ratio  21:1  


 


ABG pH  7.43  


 


ABG pCO2  34.1 L  


 


ABG pO2  51.4 L  


 


ABG HCO3  22.1  


 


ABG O2 Saturation  87.7 L  


 


ABG Base Excess  -1.6  


 


VBG pH   


 


VBG pCO2   


 


VBG HCO3   


 


VBG Base Excess   


 


FiO2  6L  


 


Sodium   


 


Potassium   


 


Chloride   


 


Carbon Dioxide   


 


Anion Gap   


 


BUN   


 


Creatinine   


 


Est GFR (African Amer)   


 


Glucose   


 


Calcium   


 


Ferritin    1050.00 H


 


Total Bilirubin   


 


AST   


 


Alkaline Phosphatase   


 


C-Reactive Protein   36.7 H 


 


Total Protein   


 


Albumin   


 


Urine Color   


 


Urine Appearance   


 


Urine pH   


 


Ur Specific Gravity   


 


Urine Protein   


 


Urine Glucose (UA)   


 


Urine Ketones   


 


Urine Blood   


 


Urine Nitrite   


 


Ur Leukocyte Esterase   


 


Urine WBC (Auto)   


 


Urine RBC (Auto)   














  12/25/20





  16:30


 


WBC 


 


RBC 


 


Hgb 


 


Hct 


 


MCV 


 


MCH 


 


MCHC 


 


RDW 


 


Plt Count 


 


Seg Neutrophils % 


 


Carbonic Acid 


 


HCO3/H2CO3 Ratio 


 


ABG pH 


 


ABG pCO2 


 


ABG pO2 


 


ABG HCO3 


 


ABG O2 Saturation 


 


ABG Base Excess 


 


VBG pH 


 


VBG pCO2 


 


VBG HCO3 


 


VBG Base Excess 


 


FiO2 


 


Sodium 


 


Potassium 


 


Chloride 


 


Carbon Dioxide 


 


Anion Gap 


 


BUN 


 


Creatinine 


 


Est GFR (African Amer) 


 


Glucose 


 


Calcium 


 


Ferritin 


 


Total Bilirubin 


 


AST 


 


Alkaline Phosphatase 


 


C-Reactive Protein 


 


Total Protein 


 


Albumin 


 


Urine Color  YELLOW


 


Urine Appearance  CLEAR


 


Urine pH  6.0


 


Ur Specific [place]  1.011


 


Urine Protein  30 H


 


Urine Glucose (UA)  NEGATIVE


 


Urine Ketones  NEGATIVE


 


Urine Blood  NEGATIVE


 


Urine Nitrite  NEGATIVE


 


Ur Leukocyte Esterase  NEGATIVE


 


Urine WBC (Auto)  1


 


Urine RBC (Auto)  1











Impressions: 


                                        





Chest X-Ray  12/25/20 15:03


IMPRESSION:  Cannot exclude a limited left lower lobe pneumonia.


 














Assessment & Plan





- Diagnosis


(1) SARS-associated coronavirus infection


Is this a current diagnosis for this admission?: Yes   





(2) Pneumonia


Qualifiers: 


   Pneumonia type: due to unspecified organism   Laterality: left   Lung 

location: lower lobe of lung   Qualified Code(s): J18.9 - Pneumonia, unspecified

organism   


Is this a current diagnosis for this admission?: Yes   





(3) Hypoxia


Is this a current diagnosis for this admission?: Yes   





(4) Diabetes mellitus type 2 in nonobese


Is this a current diagnosis for this admission?: Yes   





(5) Hypothyroidism


Qualifiers: 


   Hypothyroidism type: unspecified   Qualified Code(s): E03.9 - Hypothyroidism,

unspecified   


Is this a current diagnosis for this admission?: Yes   





- Time


Time Spent with patient: 25-34 minutes


Level of Care: IMCU


Medications reviewed and adjusted accordingly: Yes


Anticipated discharge: Home with Homehealth


Anticipated DC Timeframe: within 72 hours





- Inpatient Certification


Based on my medical assessment, after consideration of the patient's 

comorbidities, presenting symptoms, or acuity I expect that the services needed 

warrant INPATIENT care.: Yes


I certify that my determination is in accordance with my understanding of 

Medicare's requirements for reasonable and necessary INPATIENT services [42 CFR 

412.3e].: Yes


Medical Necessity: Significant Comorbidiites Make Outpatient Treatment Too 

Risky, Need Close Monitoring Due to Risk of Patient Decompensation, Need For IV 

Fluids, Need For Continuous Telemetry Monitoring, Need for IV Antibiotics, Risk 

of Complication if Not Cared For in Hospital, Risk of Diagnosis Which Will 

Require Inpatient Eval/Care/Monitoring


Post Hospital Care: D/C Planner Documentation





- Plan Summary


Plan Summary: 





Continue IV Azithromycin and Aztreonam coverage. H will receive day 2 Ivermectin

dose today. Maintain on all other medication management. Start on BiPAP support.

Obtain CBC with diff and CMP in AM.

## 2020-12-27 LAB
ABSOLUTE LYMPHOCYTES# (MANUAL): 0.9 10^3/UL (ref 0.5–4.7)
ABSOLUTE MONOCYTES # (MANUAL): 1.1 10^3/UL (ref 0.1–1.4)
ADD MANUAL DIFF: YES
ALBUMIN SERPL-MCNC: 3 G/DL (ref 3.5–5)
ALP SERPL-CCNC: 60 U/L (ref 38–126)
ANION GAP SERPL CALC-SCNC: 5 MMOL/L (ref 5–19)
ANISOCYTOSIS BLD QL SMEAR: (no result)
AST SERPL-CCNC: 50 U/L (ref 17–59)
BASOPHILS NFR BLD MANUAL: 0 % (ref 0–2)
BILIRUB DIRECT SERPL-MCNC: 0.1 MG/DL (ref 0–0.4)
BILIRUB SERPL-MCNC: 0.7 MG/DL (ref 0.2–1.3)
BUN SERPL-MCNC: 24 MG/DL (ref 7–20)
BURR CELLS BLD QL SMEAR: SLIGHT
CALCIUM: 8.2 MG/DL (ref 8.4–10.2)
CHLORIDE SERPL-SCNC: 105 MMOL/L (ref 98–107)
CHOLEST SERPL-MCNC: 103.13 MG/DL (ref 0–200)
CO2 SERPL-SCNC: 25 MMOL/L (ref 22–30)
DACRYOCYTES BLD QL SMEAR: (no result)
EOSINOPHIL NFR BLD MANUAL: 0 % (ref 0–6)
ERYTHROCYTE [DISTWIDTH] IN BLOOD BY AUTOMATED COUNT: 12.3 % (ref 11.5–14)
GLUCOSE SERPL-MCNC: 121 MG/DL (ref 75–110)
HCT VFR BLD CALC: 34.9 % (ref 37.9–51)
HGB BLD-MCNC: 11.4 G/DL (ref 13.5–17)
LDLC SERPL DIRECT ASSAY-MCNC: 56 MG/DL (ref ?–100)
MCH RBC QN AUTO: 29.1 PG (ref 27–33.4)
MCHC RBC AUTO-ENTMCNC: 32.6 G/DL (ref 32–36)
MCV RBC AUTO: 89 FL (ref 80–97)
MONOCYTES % (MANUAL): 12 % (ref 3–13)
OVALOCYTES BLD QL SMEAR: (no result)
PLATELET # BLD: 228 10^3/UL (ref 150–450)
PLATELET COMMENT: ADEQUATE
POIKILOCYTOSIS BLD QL SMEAR: (no result)
POTASSIUM SERPL-SCNC: 4.8 MMOL/L (ref 3.6–5)
PROT SERPL-MCNC: 6.1 G/DL (ref 6.3–8.2)
RBC # BLD AUTO: 3.91 10^6/UL (ref 4.35–5.55)
SEGMENTED NEUTROPHILS % (MAN): 78 % (ref 42–78)
TOTAL CELLS COUNTED BLD: 100
TOXIC GRANULES BLD QL SMEAR: (no result)
TRIGL SERPL-MCNC: 74 MG/DL (ref ?–150)
VARIANT LYMPHS NFR BLD MANUAL: 10 % (ref 13–45)
VLDLC SERPL CALC-MCNC: 15 MG/DL (ref 10–31)
WBC # BLD AUTO: 9.1 10^3/UL (ref 4–10.5)

## 2020-12-27 RX ADMIN — ENOXAPARIN SODIUM SCH MG: 40 INJECTION SUBCUTANEOUS at 09:36

## 2020-12-27 RX ADMIN — DEXAMETHASONE SODIUM PHOSPHATE SCH MG: 10 INJECTION INTRAMUSCULAR; INTRAVENOUS at 09:35

## 2020-12-27 RX ADMIN — OXYCODONE HYDROCHLORIDE AND ACETAMINOPHEN SCH MG: 500 TABLET ORAL at 05:18

## 2020-12-27 RX ADMIN — OXYCODONE HYDROCHLORIDE AND ACETAMINOPHEN SCH MG: 500 TABLET ORAL at 11:23

## 2020-12-27 RX ADMIN — INSULIN LISPRO SCH UNIT: 100 INJECTION, SOLUTION INTRAVENOUS; SUBCUTANEOUS at 21:50

## 2020-12-27 RX ADMIN — OXYCODONE HYDROCHLORIDE AND ACETAMINOPHEN SCH MG: 500 TABLET ORAL at 17:34

## 2020-12-27 RX ADMIN — OXYCODONE HYDROCHLORIDE AND ACETAMINOPHEN SCH: 500 TABLET ORAL at 05:17

## 2020-12-27 RX ADMIN — INSULIN LISPRO SCH: 100 INJECTION, SOLUTION INTRAVENOUS; SUBCUTANEOUS at 09:04

## 2020-12-27 RX ADMIN — INSULIN LISPRO SCH: 100 INJECTION, SOLUTION INTRAVENOUS; SUBCUTANEOUS at 13:17

## 2020-12-27 RX ADMIN — INSULIN LISPRO SCH UNIT: 100 INJECTION, SOLUTION INTRAVENOUS; SUBCUTANEOUS at 18:25

## 2020-12-27 RX ADMIN — Medication SCH MG: at 09:36

## 2020-12-27 RX ADMIN — AZTREONAM SCH MLS/HR: 1 INJECTION, POWDER, LYOPHILIZED, FOR SOLUTION INTRAMUSCULAR; INTRAVENOUS at 09:36

## 2020-12-27 RX ADMIN — SODIUM CHLORIDE PRN MLS/HR: 9 INJECTION, SOLUTION INTRAVENOUS at 05:22

## 2020-12-27 RX ADMIN — AZTREONAM SCH MLS/HR: 1 INJECTION, POWDER, LYOPHILIZED, FOR SOLUTION INTRAMUSCULAR; INTRAVENOUS at 02:00

## 2020-12-27 RX ADMIN — VITAMIN D, TAB 1000IU (100/BT) SCH UNIT: 25 TAB at 09:36

## 2020-12-27 RX ADMIN — AZITHROMYCIN MONOHYDRATE SCH MLS/HR: 500 INJECTION, POWDER, LYOPHILIZED, FOR SOLUTION INTRAVENOUS at 18:25

## 2020-12-27 RX ADMIN — AZTREONAM SCH MLS/HR: 1 INJECTION, POWDER, LYOPHILIZED, FOR SOLUTION INTRAMUSCULAR; INTRAVENOUS at 17:34

## 2020-12-27 RX ADMIN — Medication SCH MG: at 21:07

## 2020-12-27 RX ADMIN — REMDESIVIR SCH MLS/HR: 100 INJECTION, POWDER, LYOPHILIZED, FOR SOLUTION INTRAVENOUS at 11:24

## 2020-12-27 RX ADMIN — Medication SCH TAB: at 09:35

## 2020-12-27 RX ADMIN — SODIUM CHLORIDE PRN MLS/HR: 9 INJECTION, SOLUTION INTRAVENOUS at 11:26

## 2020-12-27 RX ADMIN — SODIUM CHLORIDE PRN MLS/HR: 9 INJECTION, SOLUTION INTRAVENOUS at 21:30

## 2020-12-27 NOTE — PDOC PROGRESS REPORT
Subjective


Date:: 12/27/20


Subjective:: 





Patient remain on partial nonrebreathing supplemental oxygen. No chest pain. No 

abdominal pain, diarrhea, nausea or vomiting. No fever or chills.


Reason For Visit: 


COVID-19 PNEUMONIA; DM TYPE 2; HYPOTHRYOIDISM








Physical Exam


Vital Signs: 


                                        











Temp Pulse Resp BP Pulse Ox


 


 98.9 F   83   26 H  119/65   91 L


 


 12/27/20 08:57  12/27/20 08:57  12/27/20 08:57  12/27/20 08:57  12/27/20 08:57








                                 Intake & Output











 12/26/20 12/27/20 12/28/20





 06:59 06:59 06:59


 


Intake Total 1000 3260 


 


Output Total  225 


 


Balance 1000 3035 


 


Weight 77.111 kg 71.5 kg 











Physical Exam: 





General appearance: PRESENT: mild distress


Head exam: PRESENT: atraumatic, normocephalic


Eye exam: PRESENT: conjunctiva pink.  ABSENT: pallor, scleral icterus


Mouth exam: PRESENT: moist


Respiratory exam: PRESENT: clear to auscultation iris, crackles - at lung bases, 

decreased breath sounds - at lung bases


Cardiovascular exam: PRESENT: RRR, +S1, +S2.  ABSENT: diastolic murmur, systolic

murmur


GI/Abdominal exam: PRESENT: normal bowel sounds, soft.  ABSENT: tenderness


Extremities exam: ABSENT: pedal edema


Neurological exam: PRESENT: alert, awake


Psychiatric exam: ABSENT: agitated, anxious


Skin exam: PRESENT: dry, warm





Results


Laboratory Results: 


                                        





                                 12/27/20 05:02 





                                 12/27/20 05:02 





                                        











  12/27/20 12/27/20





  05:02 05:02


 


WBC  9.1 


 


RBC  3.91 L 


 


Hgb  11.4 L 


 


Hct  34.9 L 


 


MCV  89 


 


MCH  29.1 


 


MCHC  32.6 


 


RDW  12.3 


 


Plt Count  228 


 


Seg Neutrophils %  Not Reportable 


 


Sodium   135.2 L


 


Potassium   4.8


 


Chloride   105


 


Carbon Dioxide   25


 


Anion Gap   5


 


BUN   24 H


 


Creatinine   1.09


 


Est GFR (African Amer)   > 60


 


Glucose   121 H


 


Calcium   8.2 L


 


Total Bilirubin   0.7


 


AST   50


 


Alkaline Phosphatase   60


 


Total Protein   6.1 L


 


Albumin   3.0 L


 


Triglycerides   74


 


Cholesterol   103.13


 


LDL Cholesterol Direct   56


 


VLDL Cholesterol   15.0


 


HDL Cholesterol   32 L











Impressions: 


                                        





Chest X-Ray  12/25/20 15:03


IMPRESSION:  Cannot exclude a limited left lower lobe pneumonia.


 














Assessment & Plan





- Diagnosis


(1) SARS-associated coronavirus infection


Is this a current diagnosis for this admission?: Yes   





(2) Pneumonia


Qualifiers: 


   Pneumonia type: due to unspecified organism   Laterality: left   Lung 

location: lower lobe of lung   Qualified Code(s): J18.9 - Pneumonia, unspecified

organism   


Is this a current diagnosis for this admission?: Yes   





(3) Hypoxia


Is this a current diagnosis for this admission?: Yes   





(4) Diabetes mellitus type 2 in nonobese


Is this a current diagnosis for this admission?: Yes   





(5) Hypothyroidism


Qualifiers: 


   Hypothyroidism type: unspecified   Qualified Code(s): E03.9 - Hypothyroidism,

unspecified   


Is this a current diagnosis for this admission?: Yes   





- Time


Time Spent with patient: 25-34 minutes


Level of Care: IMCU


Medications reviewed and adjusted accordingly: Yes


Anticipated discharge: Home with Homehealth, SNF


Anticipated DC Timeframe: within 72 hours





- Inpatient Certification


Based on my medical assessment, after consideration of the patient's co

morbidities, presenting symptoms, or acuity I expect that the services needed 

warrant INPATIENT care.: Yes


I certify that my determination is in accordance with my understanding of 

Medicare's requirements for reasonable and necessary INPATIENT services [42 CFR 

412.3e].: Yes


Medical Necessity: Significant Comorbidiites Make Outpatient Treatment Too 

Risky, Need Close Monitoring Due to Risk of Patient Decompensation, Need For IV 

Fluids, Need For Continuous Telemetry Monitoring, Need for IV Antibiotics, Risk 

of Complication if Not Cared For in Hospital, Risk of Diagnosis Which Will 

Require Inpatient Eval/Care/Monitoring


Post Hospital Care: D/C Planner Documentation, D/C or Transfer Summary





- Plan Summary


Plan Summary: 





Continue current medication management.

## 2020-12-28 RX ADMIN — AZITHROMYCIN MONOHYDRATE SCH MLS/HR: 500 INJECTION, POWDER, LYOPHILIZED, FOR SOLUTION INTRAVENOUS at 17:08

## 2020-12-28 RX ADMIN — OXYCODONE HYDROCHLORIDE AND ACETAMINOPHEN SCH MG: 500 TABLET ORAL at 23:44

## 2020-12-28 RX ADMIN — AZTREONAM SCH MLS/HR: 1 INJECTION, POWDER, LYOPHILIZED, FOR SOLUTION INTRAMUSCULAR; INTRAVENOUS at 10:38

## 2020-12-28 RX ADMIN — INSULIN LISPRO SCH UNIT: 100 INJECTION, SOLUTION INTRAVENOUS; SUBCUTANEOUS at 21:44

## 2020-12-28 RX ADMIN — OXYCODONE HYDROCHLORIDE AND ACETAMINOPHEN SCH: 500 TABLET ORAL at 00:00

## 2020-12-28 RX ADMIN — OXYCODONE HYDROCHLORIDE AND ACETAMINOPHEN SCH MG: 500 TABLET ORAL at 17:06

## 2020-12-28 RX ADMIN — OXYCODONE HYDROCHLORIDE AND ACETAMINOPHEN SCH MG: 500 TABLET ORAL at 07:04

## 2020-12-28 RX ADMIN — AZTREONAM SCH MLS/HR: 1 INJECTION, POWDER, LYOPHILIZED, FOR SOLUTION INTRAMUSCULAR; INTRAVENOUS at 17:02

## 2020-12-28 RX ADMIN — INSULIN LISPRO SCH UNIT: 100 INJECTION, SOLUTION INTRAVENOUS; SUBCUTANEOUS at 17:08

## 2020-12-28 RX ADMIN — Medication SCH TAB: at 10:43

## 2020-12-28 RX ADMIN — Medication SCH MG: at 21:44

## 2020-12-28 RX ADMIN — INSULIN LISPRO SCH UNIT: 100 INJECTION, SOLUTION INTRAVENOUS; SUBCUTANEOUS at 08:39

## 2020-12-28 RX ADMIN — Medication SCH MG: at 10:43

## 2020-12-28 RX ADMIN — DEXAMETHASONE SODIUM PHOSPHATE SCH MG: 10 INJECTION INTRAMUSCULAR; INTRAVENOUS at 10:43

## 2020-12-28 RX ADMIN — VITAMIN D, TAB 1000IU (100/BT) SCH UNIT: 25 TAB at 10:43

## 2020-12-28 RX ADMIN — REMDESIVIR SCH MLS/HR: 100 INJECTION, POWDER, LYOPHILIZED, FOR SOLUTION INTRAVENOUS at 12:37

## 2020-12-28 RX ADMIN — ENOXAPARIN SODIUM SCH MG: 40 INJECTION SUBCUTANEOUS at 10:43

## 2020-12-28 RX ADMIN — OXYCODONE HYDROCHLORIDE AND ACETAMINOPHEN SCH MG: 500 TABLET ORAL at 12:25

## 2020-12-28 RX ADMIN — INSULIN LISPRO SCH: 100 INJECTION, SOLUTION INTRAVENOUS; SUBCUTANEOUS at 12:37

## 2020-12-28 RX ADMIN — AZTREONAM SCH MLS/HR: 1 INJECTION, POWDER, LYOPHILIZED, FOR SOLUTION INTRAMUSCULAR; INTRAVENOUS at 06:00

## 2020-12-28 NOTE — PDOC PROGRESS REPORT
Subjective


Date:: 12/28/20


Subjective:: 





Patient was admitted for acute hypoxemic respiratory failure due to Covid pneumo

tung


Reason For Visit: 


COVID-19 PNEUMONIA; DM TYPE 2; HYPOTHRYOIDISM








Physical Exam


Vital Signs: 


                                        











Temp Pulse Resp BP Pulse Ox


 


 98.6 F   93   20   127/66 H  96 


 


 12/28/20 20:19  12/28/20 20:19  12/28/20 20:19  12/28/20 20:19  12/28/20 20:19








                                 Intake & Output











 12/27/20 12/28/20 12/29/20





 06:59 06:59 06:59


 


Intake Total 3260 2827 120


 


Output Total 225 550 650


 


Balance 3035 2277 -530


 


Weight 71.5 kg 72.7 kg 











General appearance: PRESENT: no acute distress


Eye exam: PRESENT: PERRLA


Respiratory exam: PRESENT: rhonchi


Cardiovascular exam: PRESENT: +S1, +S2


GI/Abdominal exam: PRESENT: soft


Neurological exam: PRESENT: alert, CN II-XII grossly intact





Results


Laboratory Results: 


                                        





                                 12/27/20 05:02 





                                 12/27/20 05:02 





                                        





12/25/20 16:30   Blood   Blood Culture (PCR) - Final


                            Staphylococcus Species








Impressions: 


                                        





Chest X-Ray  12/25/20 15:03


IMPRESSION:  Cannot exclude a limited left lower lobe pneumonia.


 














Assessment & Plan





- Diagnosis


(1) Acute hypoxemic respiratory failure


Is this a current diagnosis for this admission?: Yes   


Plan: 


Continue supplemental oxygen








(2) Pneumonia due to COVID-19 virus


Is this a current diagnosis for this admission?: Yes   


Plan: 


Continue remdesivir, dexamethasone IV antibiotic








- Time


Time Spent with patient: 35 or more minutes


Level of Care: IMCU


Medications reviewed and adjusted accordingly: Yes


Anticipated discharge: Home


Anticipated DC Timeframe: Other

## 2020-12-29 RX ADMIN — ENOXAPARIN SODIUM SCH MG: 40 INJECTION SUBCUTANEOUS at 09:16

## 2020-12-29 RX ADMIN — INSULIN LISPRO SCH UNIT: 100 INJECTION, SOLUTION INTRAVENOUS; SUBCUTANEOUS at 17:32

## 2020-12-29 RX ADMIN — Medication SCH TAB: at 09:16

## 2020-12-29 RX ADMIN — INSULIN LISPRO SCH UNIT: 100 INJECTION, SOLUTION INTRAVENOUS; SUBCUTANEOUS at 14:06

## 2020-12-29 RX ADMIN — VITAMIN D, TAB 1000IU (100/BT) SCH UNIT: 25 TAB at 09:16

## 2020-12-29 RX ADMIN — INSULIN LISPRO SCH UNIT: 100 INJECTION, SOLUTION INTRAVENOUS; SUBCUTANEOUS at 21:46

## 2020-12-29 RX ADMIN — Medication SCH MG: at 21:46

## 2020-12-29 RX ADMIN — AZTREONAM SCH MLS/HR: 1 INJECTION, POWDER, LYOPHILIZED, FOR SOLUTION INTRAMUSCULAR; INTRAVENOUS at 09:16

## 2020-12-29 RX ADMIN — REMDESIVIR SCH MLS/HR: 100 INJECTION, POWDER, LYOPHILIZED, FOR SOLUTION INTRAVENOUS at 10:50

## 2020-12-29 RX ADMIN — AZTREONAM SCH MLS/HR: 1 INJECTION, POWDER, LYOPHILIZED, FOR SOLUTION INTRAMUSCULAR; INTRAVENOUS at 18:59

## 2020-12-29 RX ADMIN — DEXAMETHASONE SODIUM PHOSPHATE SCH MG: 10 INJECTION INTRAMUSCULAR; INTRAVENOUS at 09:16

## 2020-12-29 RX ADMIN — Medication SCH MG: at 09:18

## 2020-12-29 RX ADMIN — INSULIN LISPRO SCH: 100 INJECTION, SOLUTION INTRAVENOUS; SUBCUTANEOUS at 09:14

## 2020-12-29 RX ADMIN — AZTREONAM SCH MLS/HR: 1 INJECTION, POWDER, LYOPHILIZED, FOR SOLUTION INTRAMUSCULAR; INTRAVENOUS at 01:45

## 2020-12-29 RX ADMIN — SODIUM CHLORIDE PRN MLS/HR: 9 INJECTION, SOLUTION INTRAVENOUS at 05:38

## 2020-12-29 RX ADMIN — OXYCODONE HYDROCHLORIDE AND ACETAMINOPHEN SCH MG: 500 TABLET ORAL at 14:08

## 2020-12-29 RX ADMIN — OXYCODONE HYDROCHLORIDE AND ACETAMINOPHEN SCH MG: 500 TABLET ORAL at 17:29

## 2020-12-29 RX ADMIN — OXYCODONE HYDROCHLORIDE AND ACETAMINOPHEN SCH MG: 500 TABLET ORAL at 05:38

## 2020-12-29 RX ADMIN — OXYCODONE HYDROCHLORIDE AND ACETAMINOPHEN SCH MG: 500 TABLET ORAL at 23:01

## 2020-12-29 RX ADMIN — AZITHROMYCIN MONOHYDRATE SCH MLS/HR: 500 INJECTION, POWDER, LYOPHILIZED, FOR SOLUTION INTRAVENOUS at 17:28

## 2020-12-29 NOTE — PDOC PROGRESS REPORT
Subjective


Date:: 12/29/20


Subjective:: 





Patient seen by the bedside, he continues to require supplemental oxygen via hig

h flow oxygen, he has acute hypoxemic alanna failure due to Covid pneumonia


Reason For Visit: 


COVID-19 PNEUMONIA; DM TYPE 2; HYPOTHRYOIDISM








Physical Exam


Vital Signs: 


                                        











Temp Pulse Resp BP Pulse Ox


 


 98.4 F   108 H  20   137/70 H  79 L


 


 12/29/20 17:29  12/29/20 19:00  12/29/20 17:29  12/29/20 17:29  12/29/20 17:29








                                 Intake & Output











 12/28/20 12/29/20 12/30/20





 06:59 06:59 06:59


 


Intake Total 3639 620 120


 


Output Total 550 1475 500


 


Balance 1517 -857 -114


 


Weight 72.7 kg 72.1 kg 











General appearance: PRESENT: mild distress


Eye exam: PRESENT: PERRLA


Respiratory exam: PRESENT: rhonchi


Cardiovascular exam: PRESENT: +S1, +S2


GI/Abdominal exam: PRESENT: soft


Neurological exam: PRESENT: alert





Results


Laboratory Results: 


                                        





                                 12/27/20 05:02 





                                 12/27/20 05:02 





                                        





12/25/20 16:30   Blood   Blood Culture (PCR) - Final


                            Staphylococcus Species








Impressions: 


                                        





Chest X-Ray  12/25/20 15:03


IMPRESSION:  Cannot exclude a limited left lower lobe pneumonia.


 














Assessment & Plan





- Diagnosis


(1) Acute hypoxemic respiratory failure


Is this a current diagnosis for this admission?: Yes   


Plan: 


Patient will continue high flow oxygen, is sometimes alternate with BiPAP on 

nonrebreathing mask








(2) Pneumonia due to COVID-19 virus


Is this a current diagnosis for this admission?: Yes   


Plan: 


Patient will continue IV dexamethasone remdesivir IV antibiotic








(3) Type 2 diabetes mellitus without complications


Qualifiers: 


   Diabetes mellitus long term insulin use: without long term use   Qualified 

Code(s): E11.9 - Type 2 diabetes mellitus without complications   


Is this a current diagnosis for this admission?: Yes   





- Time


Time Spent with patient: 35 or more minutes


Level of Care: IMCU


Medications reviewed and adjusted accordingly: Yes


Anticipated discharge: Home


Anticipated DC Timeframe: Other

## 2020-12-30 LAB
ARTERIAL BLOOD FIO2: (no result)
ARTERIAL BLOOD H2CO3: 1.28 MMOL/L (ref 1.05–1.35)
ARTERIAL BLOOD HCO3: 27.1 MMOL/L (ref 20–24)
ARTERIAL BLOOD PCO2: 42.4 MMHG (ref 35–45)
ARTERIAL BLOOD PH: 7.42 (ref 7.35–7.45)
ARTERIAL BLOOD PO2: 43.5 MMHG (ref 80–100)
ARTERIAL BLOOD TOTAL CO2: 28.4 MMOL/L (ref 23–27)
BASE EXCESS BLDA CALC-SCNC: 2.3 MMOL/L
SAO2 % BLDA: 80.3 % (ref 94–98)

## 2020-12-30 RX ADMIN — AZTREONAM SCH MLS/HR: 1 INJECTION, POWDER, LYOPHILIZED, FOR SOLUTION INTRAMUSCULAR; INTRAVENOUS at 17:13

## 2020-12-30 RX ADMIN — DEXAMETHASONE SODIUM PHOSPHATE SCH MG: 10 INJECTION INTRAMUSCULAR; INTRAVENOUS at 09:13

## 2020-12-30 RX ADMIN — INSULIN LISPRO SCH: 100 INJECTION, SOLUTION INTRAVENOUS; SUBCUTANEOUS at 17:13

## 2020-12-30 RX ADMIN — INSULIN LISPRO SCH UNIT: 100 INJECTION, SOLUTION INTRAVENOUS; SUBCUTANEOUS at 22:06

## 2020-12-30 RX ADMIN — AZTREONAM SCH MLS/HR: 1 INJECTION, POWDER, LYOPHILIZED, FOR SOLUTION INTRAMUSCULAR; INTRAVENOUS at 09:14

## 2020-12-30 RX ADMIN — INSULIN LISPRO SCH UNIT: 100 INJECTION, SOLUTION INTRAVENOUS; SUBCUTANEOUS at 12:29

## 2020-12-30 RX ADMIN — ENOXAPARIN SODIUM SCH MG: 40 INJECTION SUBCUTANEOUS at 09:13

## 2020-12-30 RX ADMIN — SODIUM CHLORIDE PRN MLS/HR: 9 INJECTION, SOLUTION INTRAVENOUS at 01:02

## 2020-12-30 RX ADMIN — Medication SCH TAB: at 09:13

## 2020-12-30 RX ADMIN — OXYCODONE HYDROCHLORIDE AND ACETAMINOPHEN SCH MG: 500 TABLET ORAL at 05:58

## 2020-12-30 RX ADMIN — Medication SCH MG: at 22:02

## 2020-12-30 RX ADMIN — AZTREONAM SCH MLS/HR: 1 INJECTION, POWDER, LYOPHILIZED, FOR SOLUTION INTRAMUSCULAR; INTRAVENOUS at 01:01

## 2020-12-30 RX ADMIN — INSULIN LISPRO SCH: 100 INJECTION, SOLUTION INTRAVENOUS; SUBCUTANEOUS at 09:10

## 2020-12-30 RX ADMIN — REMDESIVIR SCH MLS/HR: 100 INJECTION, POWDER, LYOPHILIZED, FOR SOLUTION INTRAVENOUS at 10:03

## 2020-12-30 RX ADMIN — OXYCODONE HYDROCHLORIDE AND ACETAMINOPHEN SCH MG: 500 TABLET ORAL at 23:33

## 2020-12-30 RX ADMIN — OXYCODONE HYDROCHLORIDE AND ACETAMINOPHEN SCH: 500 TABLET ORAL at 17:13

## 2020-12-30 RX ADMIN — Medication SCH MG: at 09:13

## 2020-12-30 RX ADMIN — OXYCODONE HYDROCHLORIDE AND ACETAMINOPHEN SCH MG: 500 TABLET ORAL at 12:29

## 2020-12-30 RX ADMIN — VITAMIN D, TAB 1000IU (100/BT) SCH UNIT: 25 TAB at 09:13

## 2020-12-30 RX ADMIN — AZITHROMYCIN MONOHYDRATE SCH MLS/HR: 500 INJECTION, POWDER, LYOPHILIZED, FOR SOLUTION INTRAVENOUS at 17:13

## 2020-12-30 NOTE — PDOC PROGRESS REPORT
Subjective


Date:: 12/30/20


Subjective:: 





Patient seen by bedside she continues to require noninvasive positive pressure, 

BiPAP he has profound hypoxemia despite FiO2 100%  on BiPAP, Patient is awake 

alert still responsive.There is mild hypercapnia but pH is normal, patient 

probably is not ready for trach intubation at the moment but he may get there.  

I called the daughter to explain to about his condition, he has no living will


Reason For Visit: 


COVID-19 PNEUMONIA; DM TYPE 2; HYPOTHRYOIDISM








Physical Exam


Vital Signs: 


                                        











Temp Pulse Resp BP Pulse Ox


 


 99.0 F   108 H  23 H  121/66   93 


 


 12/30/20 15:55  12/30/20 15:55  12/30/20 16:39  12/30/20 15:55  12/30/20 16:39








                                 Intake & Output











 12/29/20 12/30/20 12/31/20





 06:59 06:59 06:59


 


Intake Total 620 1620 200


 


Output Total 1475 1000 300


 


Balance -855 620 -100


 


Weight 72.1 kg 72.9 kg 











General appearance: PRESENT: other - Patient on BiPAP


Respiratory exam: PRESENT: clear to auscultation iris


Cardiovascular exam: PRESENT: +S1, +S2


GI/Abdominal exam: PRESENT: soft


Neurological exam: PRESENT: alert





Results


Laboratory Results: 


                                        





                                 12/27/20 05:02 





                                 12/27/20 05:02 





                                        











  12/30/20





  14:55


 


Carbonic Acid  1.28


 


HCO3/H2CO3 Ratio  21:1


 


ABG pH  7.42


 


ABG pCO2  42.4


 


ABG pO2  43.5 L


 


ABG HCO3  27.1 H


 


ABG O2 Saturation  80.3 L


 


ABG Base Excess  2.3


 


FiO2  100%








                                        





12/25/20 15:16   Blood   Blood Culture - Final


                            NO GROWTH IN 5 DAYS


12/25/20 16:30   Blood   Blood Culture (PCR) - Final


                            Staphylococcus Species


12/25/20 16:30   Blood   Blood Culture - Final


                            Staphylococcus Hominis








Impressions: 


                                        





Chest X-Ray  12/25/20 15:03


IMPRESSION:  Cannot exclude a limited left lower lobe pneumonia.


 














Assessment & Plan





- Diagnosis


(1) Acute hypoxemic respiratory failure


Is this a current diagnosis for this admission?: Yes   


Plan: 


Patient continues to require noninvasive positive pressure ventilation BiPAP








(2) Pneumonia due to COVID-19 virus


Is this a current diagnosis for this admission?: Yes   


Plan: 


Patient has finished recommended intravenous remdesivir, continue IV antibiotic,

IV dexamethasone








(3) Type 2 diabetes mellitus without complications


Qualifiers: 


   Diabetes mellitus long term insulin use: without long term use   Qualified 

Code(s): E11.9 - Type 2 diabetes mellitus without complications   


Is this a current diagnosis for this admission?: Yes   





- Time


Time Spent with patient: 35 or more minutes


Level of Care: IMCU


Medications reviewed and adjusted accordingly: Yes


Anticipated discharge: Home


Anticipated DC Timeframe: Other

## 2020-12-30 NOTE — RADIOLOGY REPORT (SQ)
EXAM DESCRIPTION:  CHEST SINGLE VIEW



IMAGES COMPLETED DATE/TIME:  12/30/2020 6:47 pm



REASON FOR STUDY:  pneumonia



COMPARISON:  12/25/2020



EXAM PARAMETERS:  NUMBER OF VIEWS: One view.

TECHNIQUE: Single frontal radiographic view of the chest acquired.

RADIATION DOSE: NA

LIMITATIONS: None.



FINDINGS:  LUNGS AND PLEURA: There are patchy bilateral infiltrates

MEDIASTINUM AND HILAR STRUCTURES: No masses.  Contour normal.

HEART AND VASCULAR STRUCTURES: Heart normal in size.  Normal vasculature.

BONES: No acute findings.

HARDWARE: None in the chest.

OTHER: No other significant finding.



IMPRESSION:  Patchy bilateral infiltrates suggesting bilateral pneumonia.  Cannot exclude an atypical
 infectious/ inflammatory process such as COVID-19 pneumonia.



TECHNICAL DOCUMENTATION:  JOB ID:  [id number]

 2011 [employer]- All Rights Reserved



Reading location - IP/workstation name: [email or web address]

## 2020-12-31 LAB
%HYPO/RBC NFR BLD AUTO: SLIGHT %
ABSOLUTE LYMPHOCYTES# (MANUAL): 0.5 10^3/UL (ref 0.5–4.7)
ABSOLUTE MONOCYTES # (MANUAL): 0.2 10^3/UL (ref 0.1–1.4)
ADD MANUAL DIFF: YES
ALBUMIN SERPL-MCNC: 2.3 G/DL (ref 3.5–5)
ALP SERPL-CCNC: 99 U/L (ref 38–126)
ANION GAP SERPL CALC-SCNC: 5 MMOL/L (ref 5–19)
AST SERPL-CCNC: 40 U/L (ref 17–59)
BASOPHILS NFR BLD MANUAL: 0 % (ref 0–2)
BILIRUB DIRECT SERPL-MCNC: 0.1 MG/DL (ref 0–0.4)
BILIRUB SERPL-MCNC: 0.7 MG/DL (ref 0.2–1.3)
BUN SERPL-MCNC: 30 MG/DL (ref 7–20)
CALCIUM: 7.9 MG/DL (ref 8.4–10.2)
CHLORIDE SERPL-SCNC: 101 MMOL/L (ref 98–107)
CO2 SERPL-SCNC: 28 MMOL/L (ref 22–30)
EOSINOPHIL NFR BLD MANUAL: 0 % (ref 0–6)
ERYTHROCYTE [DISTWIDTH] IN BLOOD BY AUTOMATED COUNT: 12.5 % (ref 11.5–14)
GLUCOSE SERPL-MCNC: 184 MG/DL (ref 75–110)
HCT VFR BLD CALC: 32.3 % (ref 37.9–51)
HGB BLD-MCNC: 10.6 G/DL (ref 13.5–17)
MCH RBC QN AUTO: 29 PG (ref 27–33.4)
MCHC RBC AUTO-ENTMCNC: 32.6 G/DL (ref 32–36)
MCV RBC AUTO: 89 FL (ref 80–97)
MONOCYTES % (MANUAL): 1 % (ref 3–13)
NEUTS BAND NFR BLD MANUAL: 1 % (ref 3–5)
PLATELET # BLD: 183 10^3/UL (ref 150–450)
PLATELET COMMENT: ADEQUATE
POLYCHROMASIA BLD QL SMEAR: SLIGHT
POTASSIUM SERPL-SCNC: 4.3 MMOL/L (ref 3.6–5)
PROT SERPL-MCNC: 5.2 G/DL (ref 6.3–8.2)
RBC # BLD AUTO: 3.64 10^6/UL (ref 4.35–5.55)
SEGMENTED NEUTROPHILS % (MAN): 95 % (ref 42–78)
TOTAL CELLS COUNTED BLD: 100
VARIANT LYMPHS NFR BLD MANUAL: 3 % (ref 13–45)
WBC # BLD AUTO: 16.8 10^3/UL (ref 4–10.5)

## 2020-12-31 RX ADMIN — AZTREONAM SCH MLS/HR: 1 INJECTION, POWDER, LYOPHILIZED, FOR SOLUTION INTRAMUSCULAR; INTRAVENOUS at 01:49

## 2020-12-31 RX ADMIN — AZITHROMYCIN MONOHYDRATE SCH MLS/HR: 500 INJECTION, POWDER, LYOPHILIZED, FOR SOLUTION INTRAVENOUS at 18:26

## 2020-12-31 RX ADMIN — OXYCODONE HYDROCHLORIDE AND ACETAMINOPHEN SCH: 500 TABLET ORAL at 17:04

## 2020-12-31 RX ADMIN — OXYCODONE HYDROCHLORIDE AND ACETAMINOPHEN SCH: 500 TABLET ORAL at 11:51

## 2020-12-31 RX ADMIN — INSULIN LISPRO SCH UNIT: 100 INJECTION, SOLUTION INTRAVENOUS; SUBCUTANEOUS at 07:54

## 2020-12-31 RX ADMIN — OXYCODONE HYDROCHLORIDE AND ACETAMINOPHEN SCH MG: 500 TABLET ORAL at 05:19

## 2020-12-31 RX ADMIN — ENOXAPARIN SODIUM SCH MG: 40 INJECTION SUBCUTANEOUS at 10:18

## 2020-12-31 RX ADMIN — AZTREONAM SCH MLS/HR: 1 INJECTION, POWDER, LYOPHILIZED, FOR SOLUTION INTRAMUSCULAR; INTRAVENOUS at 17:03

## 2020-12-31 RX ADMIN — Medication SCH MG: at 21:22

## 2020-12-31 RX ADMIN — VITAMIN D, TAB 1000IU (100/BT) SCH UNIT: 25 TAB at 10:18

## 2020-12-31 RX ADMIN — INSULIN LISPRO SCH: 100 INJECTION, SOLUTION INTRAVENOUS; SUBCUTANEOUS at 11:51

## 2020-12-31 RX ADMIN — INSULIN LISPRO SCH: 100 INJECTION, SOLUTION INTRAVENOUS; SUBCUTANEOUS at 16:57

## 2020-12-31 RX ADMIN — INSULIN LISPRO SCH UNIT: 100 INJECTION, SOLUTION INTRAVENOUS; SUBCUTANEOUS at 21:22

## 2020-12-31 RX ADMIN — SODIUM CHLORIDE, SODIUM LACTATE, POTASSIUM CHLORIDE, AND CALCIUM CHLORIDE PRN MLS/HR: .6; .31; .03; .02 INJECTION, SOLUTION INTRAVENOUS at 17:12

## 2020-12-31 RX ADMIN — Medication SCH MG: at 10:18

## 2020-12-31 RX ADMIN — DEXAMETHASONE SODIUM PHOSPHATE SCH MG: 10 INJECTION INTRAMUSCULAR; INTRAVENOUS at 10:18

## 2020-12-31 RX ADMIN — AZTREONAM SCH MLS/HR: 1 INJECTION, POWDER, LYOPHILIZED, FOR SOLUTION INTRAMUSCULAR; INTRAVENOUS at 10:19

## 2020-12-31 RX ADMIN — Medication SCH TAB: at 10:18

## 2020-12-31 RX ADMIN — SODIUM CHLORIDE, SODIUM LACTATE, POTASSIUM CHLORIDE, AND CALCIUM CHLORIDE PRN MLS/HR: .6; .31; .03; .02 INJECTION, SOLUTION INTRAVENOUS at 00:44

## 2020-12-31 NOTE — PDOC PROGRESS REPORT
Subjective


Date:: 12/31/20


Subjective:: 





Patient seen by the bedside, he continues to require noninvasive positive pressu

re ventilation BiPAP ,the chest x-ray demonstrated diffuse infiltrate 

bilaterally


Reason For Visit: 


COVID-19 PNEUMONIA; DM TYPE 2; HYPOTHRYOIDISM








Physical Exam


Vital Signs: 


                                        











Temp Pulse Resp BP Pulse Ox


 


 97.9 F   98   35 H  130/65 H  82 L


 


 12/31/20 11:12  12/31/20 11:12  12/31/20 12:43  12/31/20 11:12  12/31/20 12:43








                                 Intake & Output











 12/30/20 12/31/20 01/01/21





 06:59 06:59 06:59


 


Intake Total 1620 1790 260


 


Output Total 1000 1025 740


 


Balance 620 765 -480


 


Weight 72.9 kg 74.2 kg 











General appearance: PRESENT: other - Patient on BiPAP


Respiratory exam: PRESENT: clear to auscultation iris


Cardiovascular exam: PRESENT: +S1, +S2


GI/Abdominal exam: PRESENT: soft


Neurological exam: PRESENT: alert, CN II-XII grossly intact





Results


Laboratory Results: 


                                        











  12/30/20





  14:55


 


Carbonic Acid  1.28


 


HCO3/H2CO3 Ratio  21:1


 


ABG pH  7.42


 


ABG pCO2  42.4


 


ABG pO2  43.5 L


 


ABG HCO3  27.1 H


 


ABG O2 Saturation  80.3 L


 


ABG Base Excess  2.3


 


FiO2  100%








                                        





12/25/20 15:16   Blood   Blood Culture - Final


                            NO GROWTH IN 5 DAYS


12/25/20 16:30   Blood   Blood Culture (PCR) - Final


                            Staphylococcus Species


12/25/20 16:30   Blood   Blood Culture - Final


                            Staphylococcus Hominis








Impressions: 


                                        





Chest X-Ray  12/30/20 00:00


IMPRESSION:  Patchy bilateral infiltrates suggesting bilateral pneumonia.  

Cannot exclude an atypical infectious/ inflammatory process such as COVID-19 

pneumonia.


 














Assessment & Plan





- Diagnosis


(1) Acute hypoxemic respiratory failure


Is this a current diagnosis for this admission?: Yes   


Plan: 


Patient continues to require noninvasive positive pressure ventilation BiPAP








(2) Pneumonia due to COVID-19 virus


Is this a current diagnosis for this admission?: Yes   


Plan: 


Patient has finished recommended intravenous remdesivir, continue IV antibiotic,

IV dexamethasone








(3) Type 2 diabetes mellitus without complications


Qualifiers: 


   Diabetes mellitus long term insulin use: without long term use   Qualified 

Code(s): E11.9 - Type 2 diabetes mellitus without complications   


Is this a current diagnosis for this admission?: Yes   





- Time


Time Spent with patient: 25-34 minutes


Level of Care: IMCU


Medications reviewed and adjusted accordingly: Yes


Anticipated discharge: Home


Anticipated DC Timeframe: Other

## 2021-01-01 LAB
ABSOLUTE LYMPHOCYTES# (MANUAL): 0.2 10^3/UL (ref 0.5–4.7)
ABSOLUTE MONOCYTES # (MANUAL): 1.5 10^3/UL (ref 0.1–1.4)
ADD MANUAL DIFF: YES
ARTERIAL BLOOD FIO2: (no result)
ARTERIAL BLOOD H2CO3: 1.31 MMOL/L (ref 1.05–1.35)
ARTERIAL BLOOD HCO3: 28.4 MMOL/L (ref 20–24)
ARTERIAL BLOOD PCO2: 43.5 MMHG (ref 35–45)
ARTERIAL BLOOD PH: 7.43 (ref 7.35–7.45)
ARTERIAL BLOOD PO2: 46.5 MMHG (ref 80–100)
ARTERIAL BLOOD TOTAL CO2: 29.8 MMOL/L (ref 23–27)
BASE EXCESS BLDA CALC-SCNC: 3.7 MMOL/L
BASOPHILS NFR BLD MANUAL: 0 % (ref 0–2)
EOSINOPHIL NFR BLD MANUAL: 0 % (ref 0–6)
ERYTHROCYTE [DISTWIDTH] IN BLOOD BY AUTOMATED COUNT: 12.4 % (ref 11.5–14)
ERYTHROCYTE [DISTWIDTH] IN BLOOD BY AUTOMATED COUNT: 12.8 % (ref 11.5–14)
HCT VFR BLD CALC: 32.1 % (ref 37.9–51)
HCT VFR BLD CALC: 32.7 % (ref 37.9–51)
HGB BLD-MCNC: 10.5 G/DL (ref 13.5–17)
HGB BLD-MCNC: 10.7 G/DL (ref 13.5–17)
MCH RBC QN AUTO: 28.9 PG (ref 27–33.4)
MCH RBC QN AUTO: 29.1 PG (ref 27–33.4)
MCHC RBC AUTO-ENTMCNC: 32.6 G/DL (ref 32–36)
MCHC RBC AUTO-ENTMCNC: 32.6 G/DL (ref 32–36)
MCV RBC AUTO: 89 FL (ref 80–97)
MCV RBC AUTO: 89 FL (ref 80–97)
MONOCYTES % (MANUAL): 9 % (ref 3–13)
PLATELET # BLD: 142 10^3/UL (ref 150–450)
PLATELET # BLD: 168 10^3/UL (ref 150–450)
PLATELET COMMENT: ADEQUATE
RBC # BLD AUTO: 3.62 10^6/UL (ref 4.35–5.55)
RBC # BLD AUTO: 3.67 10^6/UL (ref 4.35–5.55)
RBC MORPH BLD: (no result)
SAO2 % BLDA: 83.6 % (ref 94–98)
SEGMENTED NEUTROPHILS % (MAN): 90 % (ref 42–78)
TOTAL CELLS COUNTED BLD: 100
VARIANT LYMPHS NFR BLD MANUAL: 1 % (ref 13–45)
WBC # BLD AUTO: 16.6 10^3/UL (ref 4–10.5)
WBC # BLD AUTO: 18.1 10^3/UL (ref 4–10.5)

## 2021-01-01 RX ADMIN — OXYCODONE HYDROCHLORIDE AND ACETAMINOPHEN SCH: 500 TABLET ORAL at 03:16

## 2021-01-01 RX ADMIN — INSULIN LISPRO SCH UNIT: 100 INJECTION, SOLUTION INTRAVENOUS; SUBCUTANEOUS at 08:00

## 2021-01-01 RX ADMIN — OXYCODONE HYDROCHLORIDE AND ACETAMINOPHEN SCH: 500 TABLET ORAL at 08:43

## 2021-01-01 RX ADMIN — ENOXAPARIN SODIUM SCH MG: 40 INJECTION SUBCUTANEOUS at 09:10

## 2021-01-01 RX ADMIN — SODIUM CHLORIDE, SODIUM LACTATE, POTASSIUM CHLORIDE, AND CALCIUM CHLORIDE PRN MLS/HR: .6; .31; .03; .02 INJECTION, SOLUTION INTRAVENOUS at 12:16

## 2021-01-01 RX ADMIN — INSULIN HUMAN SCH: 100 INJECTION, SOLUTION PARENTERAL at 17:28

## 2021-01-01 RX ADMIN — Medication SCH TAB: at 12:15

## 2021-01-01 RX ADMIN — OXYCODONE HYDROCHLORIDE AND ACETAMINOPHEN SCH MG: 500 TABLET ORAL at 12:18

## 2021-01-01 RX ADMIN — DEXAMETHASONE SODIUM PHOSPHATE SCH MG: 10 INJECTION INTRAMUSCULAR; INTRAVENOUS at 09:09

## 2021-01-01 RX ADMIN — VITAMIN D, TAB 1000IU (100/BT) SCH UNIT: 25 TAB at 12:15

## 2021-01-01 RX ADMIN — AZITHROMYCIN MONOHYDRATE SCH MLS/HR: 500 INJECTION, POWDER, LYOPHILIZED, FOR SOLUTION INTRAVENOUS at 17:34

## 2021-01-01 RX ADMIN — AZTREONAM SCH MLS/HR: 1 INJECTION, POWDER, LYOPHILIZED, FOR SOLUTION INTRAMUSCULAR; INTRAVENOUS at 21:25

## 2021-01-01 RX ADMIN — Medication SCH MG: at 12:15

## 2021-01-01 RX ADMIN — OXYCODONE HYDROCHLORIDE AND ACETAMINOPHEN SCH MG: 500 TABLET ORAL at 17:33

## 2021-01-01 RX ADMIN — AZTREONAM SCH MLS/HR: 1 INJECTION, POWDER, LYOPHILIZED, FOR SOLUTION INTRAMUSCULAR; INTRAVENOUS at 14:40

## 2021-01-01 RX ADMIN — INSULIN HUMAN SCH UNIT: 100 INJECTION, SOLUTION PARENTERAL at 17:33

## 2021-01-01 RX ADMIN — Medication SCH MG: at 21:25

## 2021-01-01 RX ADMIN — AZTREONAM SCH MLS/HR: 1 INJECTION, POWDER, LYOPHILIZED, FOR SOLUTION INTRAMUSCULAR; INTRAVENOUS at 03:15

## 2021-01-01 NOTE — RADIOLOGY REPORT (SQ)
EXAM DESCRIPTION:

X-RAY CHEST- One View



CLINICAL HISTORY:

Clinical history of pneumonia.



COMPARISON:

December 30, 2020.



TECHNIQUE:

Single view of the chest.



FINDINGS:

There are overlying EKG leads and other objects. 

There is redemonstration of multifocal airspace disease

bilaterally, similar in appearance to prior exam. 

The cardiomediastinal silhouette is stable in appearance.

Osseous structures are unchanged.



IMPRESSION:

Redemonstration of multifocal airspace disease, similar in

appearance to prior exam. Findings are nonspecific and

differential diagnosis includes infectious and inflammatory

causes.

## 2021-01-01 NOTE — PDOC PROGRESS REPORT
Subjective


Date:: 01/01/21


Subjective:: 





Patient's condition remains very precarious, he continues to require noninvasive

positive pressure ventilation the arterial blood gas on FiO2 100%, pH 7.40, PO2 

46.5, bicarbonate 28.4, PCO2 43.5.  Patient is alert still responsive, I 

consulted intensivist for ICU transfer, because patient is alert, not obtunded 

and because there is no hypercapnia trach intubation will not be beneficial at 

this point.  Patient has not been able to eat ,we intend to initiate TPN once a 

PICC line is placed because NG tube will not be a good option because it cannot 

come off BiPAP at this time


Reason For Visit: 


COVID-19 PNEUMONIA; DM TYPE 2; HYPOTHRYOIDISM








Physical Exam


Vital Signs: 


                                        











Temp Pulse Resp BP Pulse Ox


 


 98.6 F   101 H  24 H  144/76 H  84 L


 


 01/01/21 16:00  01/01/21 19:00  01/01/21 19:27  01/01/21 16:00  01/01/21 19:27








                                 Intake & Output











 12/31/20 01/01/21 01/02/21





 06:59 06:59 06:59


 


Intake Total 1790 2030 1250


 


Output Total 1025 1925 


 


Balance 


 


Weight 74.2 kg 70.8 kg 











General appearance: PRESENT: other - Patient is alert on BiPAP


Respiratory exam: PRESENT: decreased breath sounds


Cardiovascular exam: PRESENT: +S1, +S2


GI/Abdominal exam: PRESENT: soft


Neurological exam: PRESENT: alert





Results


Laboratory Results: 


                                        





                                 01/01/21 17:12 





                                 12/31/20 15:53 





                                        











  01/01/21 01/01/21 01/01/21





  03:54 10:04 17:10


 


WBC  16.6 H  


 


RBC  3.62 L  


 


Hgb  10.5 L  


 


Hct  32.1 L  


 


MCV  89  


 


MCH  28.9  


 


MCHC  32.6  


 


RDW  12.4  


 


Plt Count  168  


 


Seg Neutrophils %  Not Reportable  


 


Carbonic Acid   1.31 


 


HCO3/H2CO3 Ratio   21:1 


 


ABG pH   7.43 


 


ABG pCO2   43.5 


 


ABG pO2   46.5 L 


 


ABG HCO3   28.4 H 


 


ABG O2 Saturation   83.6 L 


 


ABG Base Excess   3.7 


 


FiO2   100% 


 


Magnesium   


 


Triglycerides    96














  01/01/21 01/01/21





  17:10 17:12


 


WBC   18.1 H


 


RBC   3.67 L


 


Hgb   10.7 L


 


Hct   32.7 L


 


MCV   89


 


MCH   29.1


 


MCHC   32.6


 


RDW   12.8


 


Plt Count   142 L


 


Seg Neutrophils %  


 


Carbonic Acid  


 


HCO3/H2CO3 Ratio  


 


ABG pH  


 


ABG pCO2  


 


ABG pO2  


 


ABG HCO3  


 


ABG O2 Saturation  


 


ABG Base Excess  


 


FiO2  


 


Magnesium  2.4 H 


 


Triglycerides  











Impressions: 


                                        





Chest X-Ray  12/30/20 00:00


IMPRESSION:  Patchy bilateral infiltrates suggesting bilateral pneumonia.  

Cannot exclude an atypical infectious/ inflammatory process such as COVID-19 

pneumonia.


 














Assessment & Plan





- Diagnosis


(1) Acute hypoxemic respiratory failure


Is this a current diagnosis for this admission?: Yes   


Plan: 


He has profound hypoxemia, the PO2, 46.5 on FiO2 100%, patient requires 

noninvasive positive pressure ventilation BiPAP








(2) Pneumonia due to COVID-19 virus


Is this a current diagnosis for this admission?: Yes   


Plan: 


Patient still on IV dexamethasone, aztreonam, he has finished the 5-day course 

of remdesivir








(3) Type 2 diabetes mellitus without complications


Qualifiers: 


   Diabetes mellitus long term insulin use: without long term use   Qualified 

Code(s): E11.9 - Type 2 diabetes mellitus without complications   


Is this a current diagnosis for this admission?: Yes   





- Time


Time Spent with patient: 35 or more minutes


Level of Care: IMCU


Medications reviewed and adjusted accordingly: Yes


Anticipated discharge: Home


Anticipated DC Timeframe: Other

## 2021-01-01 NOTE — PDOC CRITICAL CARE PROG REPORT
General


Date:: 01/01/21


Hospital Day:: 7


Resuscitation Status: Full Code


Events in the past 12 to 24 Hours:: 





Increasing hypoxia, possible need for ICU.


Review of systems relevant to events:: 





Pulmonary


Reason for ICU Addmission:: Evaluation.





- Medications:


Medications reviewed and adjusted accordingly: Yes


Vasopressors:: 





None


Sedation:: 





None





Physical Exam


Vital Signs: 


                                        











Temp Pulse Resp BP Pulse Ox


 


 98.4 F   113 H  29 H  140/73 H  93 


 


 01/01/21 07:25  01/01/21 07:25  01/01/21 10:05  01/01/21 07:25  01/01/21 10:22








                                 Intake & Output











 12/31/20 01/01/21 01/02/21





 06:59 06:59 06:59


 


Intake Total 1790 2030 


 


Output Total 1025 1925 


 


Balance 765 105 


 


Weight 74.2 kg 70.8 kg 








                                  Weight/Height





Weight                           70.8 kg


Height                           5 ft 5 in








General appearance: PRESENT: no acute distress, cooperative


Head exam: PRESENT: atraumatic, normocephalic


Eye exam: PRESENT: conjunctiva pink, EOMI, PERRLA.  ABSENT: scleral icterus


Ear exam: PRESENT: normal external ear exam


Mouth exam: PRESENT: moist, tongue midline


Respiratory exam: PRESENT: accessory muscle use - SCM-mild, clear to 

auscultation iris, decreased breath sounds.  ABSENT: rales, rhonchi, wheezes


Cardiovascular exam: PRESENT: RRR, tachycardia.  ABSENT: diastolic murmur, rubs,

systolic murmur


GI/Abdominal exam: PRESENT: normal bowel sounds, soft.  ABSENT: distended, 

guarding, mass, organolmegaly, rebound, tenderness


Rectal exam: PRESENT: deferred


Extremities exam: PRESENT: full ROM.  ABSENT: calf tenderness, clubbing, pedal 

edema


Musculoskeletal exam: PRESENT: normal inspection


Neurological exam: PRESENT: alert, awake, oriented to person, oriented to place,

oriented to time, oriented to situation, CN II-XII grossly intact.  ABSENT: 

motor sensory deficit


Psychiatric exam: PRESENT: anxious


Skin exam: PRESENT: dry, intact, warm.  ABSENT: cyanosis, rash


Tubes/Lines: PRESENT: Other - Bipap.





Laboratory/Radiographs


Laboratory Results: 


                                        





                                 01/01/21 03:54 





                                 12/31/20 15:53 





                                        











  12/31/20 12/31/20 12/31/20





  14:12 14:12 15:53


 


WBC  16.8 H  


 


RBC  3.64 L  


 


Hgb  10.6 L  


 


Hct  32.3 L  


 


MCV  89  


 


MCH  29.0  


 


MCHC  32.6  


 


RDW  12.5  


 


Plt Count  183  


 


Seg Neutrophils %  Not Reportable  


 


Carbonic Acid   


 


HCO3/H2CO3 Ratio   


 


ABG pH   


 


ABG pCO2   


 


ABG pO2   


 


ABG HCO3   


 


ABG O2 Saturation   


 


ABG Base Excess   


 


FiO2   


 


Sodium   Cancelled  133.7 L


 


Potassium   Cancelled  4.3


 


Chloride   Cancelled  101


 


Carbon Dioxide   Cancelled  28


 


Anion Gap   Cancelled  5


 


BUN   Cancelled  30 H


 


Creatinine   Cancelled  0.97


 


Est GFR ( Amer)   Cancelled  > 60


 


Est GFR (Non-Af Amer)   Cancelled 


 


Glucose   Cancelled  184 H


 


Calcium   Cancelled  7.9 L


 


Total Bilirubin   Cancelled  0.7


 


AST   Cancelled  40


 


Alkaline Phosphatase   Cancelled  99


 


Total Protein   Cancelled  5.2 L


 


Albumin   Cancelled  2.3 L














  01/01/21 01/01/21





  03:54 10:04


 


WBC  16.6 H 


 


RBC  3.62 L 


 


Hgb  10.5 L 


 


Hct  32.1 L 


 


MCV  89 


 


MCH  28.9 


 


MCHC  32.6 


 


RDW  12.4 


 


Plt Count  168 


 


Seg Neutrophils %  Not Reportable 


 


Carbonic Acid   1.31


 


HCO3/H2CO3 Ratio   21:1


 


ABG pH   7.43


 


ABG pCO2   43.5


 


ABG pO2   46.5 L


 


ABG HCO3   28.4 H


 


ABG O2 Saturation   83.6 L


 


ABG Base Excess   3.7


 


FiO2   100%


 


Sodium  


 


Potassium  


 


Chloride  


 


Carbon Dioxide  


 


Anion Gap  


 


BUN  


 


Creatinine  


 


Est GFR ( Amer)  


 


Est GFR (Non-Af Amer)  


 


Glucose  


 


Calcium  


 


Total Bilirubin  


 


AST  


 


Alkaline Phosphatase  


 


Total Protein  


 


Albumin  











Impressions: 


                                        





Chest X-Ray  12/30/20 00:00


IMPRESSION:  Patchy bilateral infiltrates suggesting bilateral pneumonia.  

Cannot exclude an atypical infectious/ inflammatory process such as COVID-19 

pneumonia.


 











EKG: 





ST RBBB


All labs, radiographs, diagnostic studies and EKGs were personally reviewed: Yes


In addition, reports of radiographic and diagnostic studies were read: Yes





Assessment and Plan





- Diagnosis


(1) Pneumonia due to COVID-19 virus


Is this a current diagnosis for this admission?: Yes   


Plan: 


This man is in a dangerous age group for Covid at 85. He complains of more SOB 

with exhalation. I time shortened and e-time lengthened for comfort. He is 

mildly hypoxic with an O2 saturation 85-90%. According to latest standards for 

Covid intubation, hypoxia in and of itself is neither a reason for ICU transfer 

or intubation. Hypercarbia and obtundation are however. These are not currently 

present. Not indicated right now for intubation. Hypercarbia and obtundation are

indications for impending respiratory failure and at that point intubation is 

helpful in preserving life at the moment. No clear indications it improves 

mortality.








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


Is this a current diagnosis for this admission?: Yes   


Plan: 


Another risk factor for Covid mortality.





Plan Summary: 





Keep on IMC unless obtunded or hypercarbic. Also he will need nutrition as he 

has not eaten in a week and may benefit from a PICC and TPN as I do not see NG 

feeding being a good option as he will need to be off the bipap to place an NG 

and will give him a leak.





Critical Time


Critical Time (minutes): 40


Level of Care: IMCU


Anticipated discharge: SNF


Anticipated DC Timeframe: Other


-: 


1.  The care of a critical patient is a dynamic process.  This note is a 

representative synopsis but static in nature.  The timeframe for treatments 

given in order is not necessarily the actual time these treatments may have been

done.





2.  This patient requires critical care secondary to ongoing requirements for t

herapy not offered or safe outside the critical care environment.  Transfer to a

lower level of care will result in altered life or limb morbidity and mortality.





3.  Multidisciplinary rounds completed.





4.  ABCDE bundle addressed.

## 2021-01-02 LAB
ALBUMIN SERPL-MCNC: 2.5 G/DL (ref 3.5–5)
ALP SERPL-CCNC: 124 U/L (ref 38–126)
ANION GAP SERPL CALC-SCNC: 5 MMOL/L (ref 5–19)
APTT BLD: 30.9 SEC (ref 23.5–35.8)
ARTERIAL BLOOD FIO2: (no result)
ARTERIAL BLOOD H2CO3: 1.44 MMOL/L (ref 1.05–1.35)
ARTERIAL BLOOD HCO3: 28.7 MMOL/L (ref 20–24)
ARTERIAL BLOOD PCO2: 48 MMHG (ref 35–45)
ARTERIAL BLOOD PH: 7.39 (ref 7.35–7.45)
ARTERIAL BLOOD PO2: 41.3 MMHG (ref 80–100)
ARTERIAL BLOOD TOTAL CO2: 30.1 MMOL/L (ref 23–27)
AST SERPL-CCNC: 38 U/L (ref 17–59)
BASE EXCESS BLDA CALC-SCNC: 3.1 MMOL/L
BILIRUB DIRECT SERPL-MCNC: 0.2 MG/DL (ref 0–0.4)
BILIRUB SERPL-MCNC: 0.8 MG/DL (ref 0.2–1.3)
BUN SERPL-MCNC: 33 MG/DL (ref 7–20)
CALCIUM: 8.1 MG/DL (ref 8.4–10.2)
CHLORIDE SERPL-SCNC: 99 MMOL/L (ref 98–107)
CO2 SERPL-SCNC: 30 MMOL/L (ref 22–30)
GLUCOSE SERPL-MCNC: 135 MG/DL (ref 75–110)
INR PPP: 1.52
PHOSPHATE SERPL-MCNC: 3 MG/DL (ref 2.5–4.5)
POTASSIUM SERPL-SCNC: 4.6 MMOL/L (ref 3.6–5)
PREALB SERPL-MCNC: 9.4 MG/DL (ref 17.6–36)
PROT SERPL-MCNC: 5.7 G/DL (ref 6.3–8.2)
PROTHROMBIN TIME: 18.4 SEC (ref 11.4–15.4)
SAO2 % BLDA: 76 % (ref 94–98)

## 2021-01-02 PROCEDURE — B548ZZA ULTRASONOGRAPHY OF SUPERIOR VENA CAVA, GUIDANCE: ICD-10-PCS | Performed by: RADIOLOGY

## 2021-01-02 PROCEDURE — 02HV33Z INSERTION OF INFUSION DEVICE INTO SUPERIOR VENA CAVA, PERCUTANEOUS APPROACH: ICD-10-PCS | Performed by: RADIOLOGY

## 2021-01-02 RX ADMIN — OXYCODONE HYDROCHLORIDE AND ACETAMINOPHEN SCH: 500 TABLET ORAL at 06:00

## 2021-01-02 RX ADMIN — OXYCODONE HYDROCHLORIDE AND ACETAMINOPHEN SCH MG: 500 TABLET ORAL at 06:35

## 2021-01-02 RX ADMIN — SODIUM CHLORIDE, SODIUM LACTATE, POTASSIUM CHLORIDE, AND CALCIUM CHLORIDE PRN MLS/HR: .6; .31; .03; .02 INJECTION, SOLUTION INTRAVENOUS at 10:19

## 2021-01-02 RX ADMIN — INSULIN HUMAN SCH UNIT: 100 INJECTION, SOLUTION PARENTERAL at 12:42

## 2021-01-02 RX ADMIN — INSULIN HUMAN SCH: 100 INJECTION, SOLUTION PARENTERAL at 05:59

## 2021-01-02 RX ADMIN — Medication SCH: at 12:43

## 2021-01-02 RX ADMIN — FENTANYL CITRATE PRN MCG: 50 INJECTION INTRAMUSCULAR; INTRAVENOUS at 22:33

## 2021-01-02 RX ADMIN — SODIUM CHLORIDE, SODIUM LACTATE, POTASSIUM CHLORIDE, AND CALCIUM CHLORIDE PRN MLS/HR: .6; .31; .03; .02 INJECTION, SOLUTION INTRAVENOUS at 04:10

## 2021-01-02 RX ADMIN — INSULIN HUMAN SCH UNIT: 100 INJECTION, SOLUTION PARENTERAL at 19:23

## 2021-01-02 RX ADMIN — AZTREONAM SCH: 1 INJECTION, POWDER, LYOPHILIZED, FOR SOLUTION INTRAMUSCULAR; INTRAVENOUS at 23:49

## 2021-01-02 RX ADMIN — DEXAMETHASONE SODIUM PHOSPHATE SCH MG: 10 INJECTION INTRAMUSCULAR; INTRAVENOUS at 12:43

## 2021-01-02 RX ADMIN — OXYCODONE HYDROCHLORIDE AND ACETAMINOPHEN SCH MG: 500 TABLET ORAL at 01:10

## 2021-01-02 RX ADMIN — VITAMIN D, TAB 1000IU (100/BT) SCH: 25 TAB at 12:43

## 2021-01-02 RX ADMIN — FENTANYL CITRATE PRN MCG: 50 INJECTION INTRAMUSCULAR; INTRAVENOUS at 15:46

## 2021-01-02 RX ADMIN — ENOXAPARIN SODIUM SCH MG: 40 INJECTION SUBCUTANEOUS at 12:42

## 2021-01-02 RX ADMIN — LEUCINE, PHENYLALANINE, LYSINE, METHIONINE, ISOLEUCINE, VALINE, HISTIDINE, THREONINE, TRYPTOPHAN, ALANINE, GLYCINE, ARGININE, PROLINE, SERINE, TYROSINE, DEXTROSE PRN MLS/HR: 365; 280; 290; 200; 300; 290; 240; 210; 90; 1035; 515; 575; 340; 250; 20; 20 INJECTION INTRAVENOUS at 17:28

## 2021-01-02 RX ADMIN — OXYCODONE HYDROCHLORIDE AND ACETAMINOPHEN SCH: 500 TABLET ORAL at 19:01

## 2021-01-02 RX ADMIN — OXYCODONE HYDROCHLORIDE AND ACETAMINOPHEN SCH: 500 TABLET ORAL at 12:43

## 2021-01-02 RX ADMIN — INSULIN LISPRO SCH: 100 INJECTION, SOLUTION INTRAVENOUS; SUBCUTANEOUS at 23:49

## 2021-01-02 RX ADMIN — INSULIN HUMAN SCH UNIT: 100 INJECTION, SOLUTION PARENTERAL at 01:07

## 2021-01-02 NOTE — RADIOLOGY REPORT (SQ)
EXAM DESCRIPTION:  FLUORO/CV PLACEMENT; CHEST SINGLE VIEW; U/S GUIDE FOR VASCULAR ACCESS; PICC INSERT
ION



IMAGES COMPLETED DATE/TIME:  1/2/2021 12:32 pm; 1/2/2021 12:13 pm



REASON FOR STUDY:  PICC; PICC Placement; for IV access



COMPARISON:  None.



FLUOROSCOPY TIME:  0

1 images saved to PACS.



TECHNIQUE:  Fluoroscopic and ultrasound guided PICC placement.



LIMITATIONS:  None.



PROCEDURE:  After written consent and assessment were obtained, the patient was brought into the fluo
roscopy room and placed supine on the table. Ultrasound evaluation of potential access sites were per
formed. After successfully identifying a patent right basilic vein, the right arm was prepped and nitish
ped in a sterile fashion along with the ultrasound probe. The entry site was anesthetized with 1% lid
ocaine. A 21 gauge 7 cm needle was advanced through the skin and into the basilic vein under live ult
rasound guidance.  An ultrasound image was saved to PACS confirming access site.  A .018 guide wire w
as then inserted through the needle and into the venous system. The needle was then removed and an 11
 blade scalpel was used to make a 1cm skin incision.  A 5 fr peel-away sheath was advanced over the w
[place] and into the venous system. A measurement was then made using the existing wire and live fluorosc
opic guidance. The wire was then removed and trimmed. The PICC was advanced through the peel-away she
ath and into the venous system. The peel-away sheath was removed and the catheter was adhered to the 
patients arm with a stat lock. The catheter was then aspirated and flushed and a sterile bandage was 
placed over the access site.  A fluoroscopic spot image was saved to PACS confirming the catheter tip
 within the superior vena cava.



IMPRESSION:  SUCCESSFUL PLACEMENT OF A 5 FR DUAL LUMEN 43 CM PICC IN THE RIGHT BASILIC VEIN.



COMMENT:  Patient medication list reviewed: Yes- Quality ID# [id number]:Eligible professional attests to doc
umenting in the medical record they obtained, updated, or reviewed the patient's current medications.
.

Quality :  Final reports for procedures using fluoroscopy that document radiation exposure scott
gregoria, or exposure time and number of fluorographic images (if radiation exposure indices are not avail
able)

Quality ID #76: The patient was prepped and draped using maximum sterile barrier technique including 
cap, mask, sterile gown, sterile gloves, a large sterile sheet, hand hygiene, and 2% Chlorhexidine fo
r cutaneous antisepsis. When ultrasound is used, sterile ultrasound techniques are followed requiring
 sterile gel and sterile probes.



TECHNICAL DOCUMENTATION:  JOB ID:  [id number]

 2011 "[employer] All Rights Reserved                           rev-5/18



Reading location - IP/workstation name: [email or web address]

## 2021-01-02 NOTE — CRITICAL CARE ADMISSION REPORT
HPI


Date:: 01/02/21


Time:: 10:00


Reason for ICU Reason:: High risk of intubation


Admission Date/Time & PCP: 


Admission Date/Time: 12/25/20 18:17





 Primary Care Provider: MONICA MARINO MD








HPI: 


SERENITY DIAZ is a 85 year old male patient of Dr. Marino who presented to the

ED with recent diagnosis of positive status for COVID-19 infection and worsening

shortness of breath, particularly with exertion. Patient reported associated 

minimally productive cough, loss of smell and taste over last one week. He 

claimed associated worsening generalized weakness. He admitted exposure to his 

granddaughter was was recently diagnosed with corona virus infection. He denied 

any diarrhea or abdominal pain. No chest pain or palpitation. His initial ED 

evaluation was significant for elevation of inflammatory indices and D-Dimer, 

electrolytes derangement, and chest X ray suggestive of left lower lobe airspace

disease process. He was advised hospitalization for further evaluation and 

management. His morbidities are as listed below.





1/2/21: Asked to see patient for worsening hypoxia. Pt seen yesterday and not as

hypoxic. Awake and talking although SOB. Same for today but with O2 saturations 

in 70s. He is a high risk for intubation, however he has never been tried in the

proned position to try and remove some WOB and increase oxygenation. There sre 

some studies suggesting this be tried before intubation in spontaneously 

breathing patients.


History obtained from:: Patient, RN staff and Dr Moss.





- Diagnosis/Plan


(1) Pneumonia due to COVID-19 virus


Is this a current diagnosis for this admission?: Yes   


Plan: 


Try  self proning before intubation. With Dr. BARRERA not in house and patient already

an intubation risk, it is likely too dangerous to do this on the 3rd floor.








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


Is this a current diagnosis for this admission?: Yes   


Plan: 


Controlled








(3) Nutrition deficiency due to insufficient food


Is this a current diagnosis for this admission?: Yes   


Plan: 


A PICC line is placed and we will start TPN.





Plan Summary: 





Plan to attempt proning and try to improve saturations before intubating him.





Past Medical History


Cardiac Medical History: 


   Denies: Myocardial Infarction, Hypertension


Pulmonary Medical History: 


   Denies: Asthma


Neurological Medical History: 


   Denies: Seizures


Endocrine Medical History: Reports: Diabetes Mellitus Type 2, Hypothyroidism


GI Medical History: 


   Denies: Hepatitis, Hiatal Hernia


Musculoskeltal Medical History: Reports: Arthritis


Psychiatric Medical History: 


   Denies: Depression


Hematology: 


   Denies: Anemia, Sickle Cell Disease





Past Surgical History


Past Surgical History: 


   Denies: Pacemaker





Social/Family History





- Social History


Smoking Status: Former Smoker


Last Time Smoked: 1977


Frequency of Alcohol Use: Occasional


Hx Recreational Drug Use: No


Drugs: None


Hx Prescription Drug Abuse: No





- Medication/Allergies


Home Medications: 








Benzonatate [Tessalon Perles 100 mg Capsule] 100 mg PO Q8HP PRN 12/27/20 


Glipizide [Glucotrol] 5 mg PO DAILY 12/27/20 


Levothyroxine Sodium 50 mcg PO DAILY 12/27/20 


Methylprednisolone [Methylpred Dp] 4 mg PO ASDIR 12/27/20 








Allergies/Adverse Reactions: 


                                        





Penicillins Allergy (Intermediate, Verified 12/28/17 14:33)


   Hives











Review of Systems


Constitutional: ABSENT: chills, fever(s), headache(s), weight gain, weight loss


Respiratory: PRESENT: dyspnea





Physical Exam


Vital Signs: 


                                        











Temp Pulse Resp BP Pulse Ox


 


 98.5 F   105 H  30 H  141/73 H  88 L


 


 01/02/21 10:00  01/02/21 07:00  01/02/21 12:30  01/02/21 11:28  01/02/21 12:30








                                 Intake & Output











 01/01/21 01/02/21 01/03/21





 06:59 06:59 06:59


 


Intake Total 2030 2700 431


 


Output Total 1925 1050 200


 


Balance 105 1650 231


 


Weight 70.8 kg 70.1 kg 








                                  Weight/Height





Weight                           70.1 kg


Height                           5 ft 5 in








General appearance: PRESENT: mild distress


Head exam: PRESENT: atraumatic, normocephalic


Eye exam: PRESENT: conjunctiva pink, EOMI, PERRLA.  ABSENT: scleral icterus


Ear exam: PRESENT: normal external ear exam


Mouth exam: PRESENT: moist, tongue midline


Respiratory exam: PRESENT: clear to auscultation iris, decreased breath sounds.  

ABSENT: rales, rhonchi, wheezes


Cardiovascular exam: PRESENT: RRR, tachycardia.  ABSENT: diastolic murmur, rubs,

systolic murmur


GI/Abdominal exam: PRESENT: normal bowel sounds, soft.  ABSENT: distended, 

guarding, mass, organolmegaly, rebound, tenderness


Rectal exam: PRESENT: deferred


Gentrourinary exam: PRESENT: indwelling catheter


Extremities exam: PRESENT: full ROM.  ABSENT: calf tenderness, clubbing, pedal 

edema


Musculoskeletal exam: PRESENT: normal inspection


Neurological exam: PRESENT: alert, awake, oriented to person, oriented to place,

oriented to time, oriented to situation, CN II-XII grossly intact.  ABSENT: 

motor sensory deficit


Psychiatric exam: PRESENT: appropriate affect, normal mood.  ABSENT: homicidal 

ideation, suicidal ideation


Skin exam: PRESENT: dry, intact, warm.  ABSENT: cyanosis, rash


Tubes/Lines: PRESENT: Central Line, Other - Bipap





Laboratory/Radiographs


Laboratory Results: 


                                        





                                 01/01/21 17:12 





                                 01/02/21 07:47 





                                        











  01/01/21 01/01/21 01/01/21





  17:10 17:10 17:12


 


WBC    18.1 H


 


RBC    3.67 L


 


Hgb    10.7 L


 


Hct    32.7 L


 


MCV    89


 


MCH    29.1


 


MCHC    32.6


 


RDW    12.8


 


Plt Count    142 L


 


Carbonic Acid   


 


HCO3/H2CO3 Ratio   


 


ABG pH   


 


ABG pCO2   


 


ABG pO2   


 


ABG HCO3   


 


ABG O2 Saturation   


 


ABG Base Excess   


 


FiO2   


 


Sodium   


 


Potassium   


 


Chloride   


 


Carbon Dioxide   


 


Anion Gap   


 


BUN   


 


Creatinine   


 


Est GFR (African Amer)   


 


Glucose   


 


Calcium   


 


Phosphorus   


 


Magnesium   2.4 H 


 


Total Bilirubin   


 


AST   


 


Alkaline Phosphatase   


 


Total Protein   


 


Albumin   


 


Prealbumin   


 


Triglycerides  96  














  01/02/21 01/02/21





  07:47 09:00


 


WBC  


 


RBC  


 


Hgb  


 


Hct  


 


MCV  


 


MCH  


 


MCHC  


 


RDW  


 


Plt Count  


 


Carbonic Acid   1.44 H


 


HCO3/H2CO3 Ratio   19:1


 


ABG pH   7.39


 


ABG pCO2   48.0 H


 


ABG pO2   41.3 L


 


ABG HCO3   28.7 H


 


ABG O2 Saturation   76.0 L


 


ABG Base Excess   3.1


 


FiO2   100%


 


Sodium  133.9 L 


 


Potassium  4.6 


 


Chloride  99 


 


Carbon Dioxide  30 


 


Anion Gap  5 


 


BUN  33 H 


 


Creatinine  0.88 


 


Est GFR (African Amer)  > 60 


 


Glucose  135 H 


 


Calcium  8.1 L 


 


Phosphorus  3.0 


 


Magnesium  


 


Total Bilirubin  0.8 


 


AST  38 


 


Alkaline Phosphatase  124 


 


Total Protein  5.7 L 


 


Albumin  2.5 L 


 


Prealbumin  9.4 L 


 


Triglycerides  











Impressions: 


                                        





Guidance Fluoroscopy  01/02/21 00:00


IMPRESSION:  SUCCESSFUL PLACEMENT OF A 5 FR DUAL LUMEN 43 CM PICC IN THE RIGHT 

BASILIC VEIN.


 








Interventional Vascular Procedure  01/02/21 00:00


IMPRESSION:  SUCCESSFUL PLACEMENT OF A 5 FR DUAL LUMEN 43 CM PICC IN THE RIGHT 

BASILIC VEIN.


 








PICC Line Insertion  01/02/21 00:00


IMPRESSION:  SUCCESSFUL PLACEMENT OF A 5 FR DUAL LUMEN 43 CM PICC IN THE RIGHT 

BASILIC VEIN.


 








Chest X-Ray  01/02/21 11:50


IMPRESSION:  SUCCESSFUL PLACEMENT OF A 5 FR DUAL LUMEN 43 CM PICC IN THE RIGHT 

BASILIC VEIN.


 











All labs, radiographs, diagnostic studies and EKGs were personally reviewed: Yes


In addition, reports of radiographic and diagnostic studies were read: Yes





Critical Time


Critical Time (minutes): 40


-: 


The care of a critically ill patient is dynamic.  This note represents a static 

moment in the admission process. Orders and treatments may be given 

simultaneously and urgently, and time is not representative of the treatment 

process.





This patient requires Critical Care secondary to life threatening organ or limb 

dysfunction.  Without Critical Care services, the patient is at risk for 

increased mortality and morbidity.

## 2021-01-02 NOTE — RADIOLOGY REPORT (SQ)
EXAM DESCRIPTION:  FLUORO/CV PLACEMENT; CHEST SINGLE VIEW; U/S GUIDE FOR VASCULAR ACCESS; PICC INSERT
ION



IMAGES COMPLETED DATE/TIME:  1/2/2021 12:32 pm; 1/2/2021 12:13 pm



REASON FOR STUDY:  PICC; PICC Placement; for IV access



COMPARISON:  None.



FLUOROSCOPY TIME:  0

1 images saved to PACS.



TECHNIQUE:  Fluoroscopic and ultrasound guided PICC placement.



LIMITATIONS:  None.



PROCEDURE:  After written consent and assessment were obtained, the patient was brought into the fluo
roscopy room and placed supine on the table. Ultrasound evaluation of potential access sites were per
formed. After successfully identifying a patent right basilic vein, the right arm was prepped and nitish
ped in a sterile fashion along with the ultrasound probe. The entry site was anesthetized with 1% lid
ocaine. A 21 gauge 7 cm needle was advanced through the skin and into the basilic vein under live ult
rasound guidance.  An ultrasound image was saved to PACS confirming access site.  A .018 guide wire w
as then inserted through the needle and into the venous system. The needle was then removed and an 11
 blade scalpel was used to make a 1cm skin incision.  A 5 fr peel-away sheath was advanced over the w
[place] and into the venous system. A measurement was then made using the existing wire and live fluorosc
opic guidance. The wire was then removed and trimmed. The PICC was advanced through the peel-away she
ath and into the venous system. The peel-away sheath was removed and the catheter was adhered to the 
patients arm with a stat lock. The catheter was then aspirated and flushed and a sterile bandage was 
placed over the access site.  A fluoroscopic spot image was saved to PACS confirming the catheter tip
 within the superior vena cava.



IMPRESSION:  SUCCESSFUL PLACEMENT OF A 5 FR DUAL LUMEN 43 CM PICC IN THE RIGHT BASILIC VEIN.



COMMENT:  Patient medication list reviewed: Yes- Quality ID# [id number]:Eligible professional attests to doc
umenting in the medical record they obtained, updated, or reviewed the patient's current medications.
.

Quality :  Final reports for procedures using fluoroscopy that document radiation exposure scott
gregoria, or exposure time and number of fluorographic images (if radiation exposure indices are not avail
able)

Quality ID #76: The patient was prepped and draped using maximum sterile barrier technique including 
cap, mask, sterile gown, sterile gloves, a large sterile sheet, hand hygiene, and 2% Chlorhexidine fo
r cutaneous antisepsis. When ultrasound is used, sterile ultrasound techniques are followed requiring
 sterile gel and sterile probes.



TECHNICAL DOCUMENTATION:  JOB ID:  [id number]

 2011 [employer]- All Rights Reserved                           rev-5/18



Reading location - IP/workstation name: [id number]

## 2021-01-02 NOTE — RADIOLOGY REPORT (SQ)
EXAM DESCRIPTION:  FLUORO/CV PLACEMENT; CHEST SINGLE VIEW; U/S GUIDE FOR VASCULAR ACCESS; PICC INSERT
ION



IMAGES COMPLETED DATE/TIME:  1/2/2021 12:32 pm; 1/2/2021 12:13 pm



REASON FOR STUDY:  PICC; PICC Placement; for IV access



COMPARISON:  None.



FLUOROSCOPY TIME:  0

1 images saved to PACS.



TECHNIQUE:  Fluoroscopic and ultrasound guided PICC placement.



LIMITATIONS:  None.



PROCEDURE:  After written consent and assessment were obtained, the patient was brought into the fluo
roscopy room and placed supine on the table. Ultrasound evaluation of potential access sites were per
formed. After successfully identifying a patent right basilic vein, the right arm was prepped and nitish
ped in a sterile fashion along with the ultrasound probe. The entry site was anesthetized with 1% lid
ocaine. A 21 gauge 7 cm needle was advanced through the skin and into the basilic vein under live ult
rasound guidance.  An ultrasound image was saved to PACS confirming access site.  A .018 guide wire w
as then inserted through the needle and into the venous system. The needle was then removed and an 11
 blade scalpel was used to make a 1cm skin incision.  A 5 fr peel-away sheath was advanced over the w
[place] and into the venous system. A measurement was then made using the existing wire and live fluorosc
opic guidance. The wire was then removed and trimmed. The PICC was advanced through the peel-away she
ath and into the venous system. The peel-away sheath was removed and the catheter was adhered to the 
patients arm with a stat lock. The catheter was then aspirated and flushed and a sterile bandage was 
placed over the access site.  A fluoroscopic spot image was saved to PACS confirming the catheter tip
 within the superior vena cava.



IMPRESSION:  SUCCESSFUL PLACEMENT OF A 5 FR DUAL LUMEN 43 CM PICC IN THE RIGHT BASILIC VEIN.



COMMENT:  Patient medication list reviewed: Yes- Quality ID# [id number]:Eligible professional attests to doc
umenting in the medical record they obtained, updated, or reviewed the patient's current medications.
.

Quality :  Final reports for procedures using fluoroscopy that document radiation exposure scott
gregoria, or exposure time and number of fluorographic images (if radiation exposure indices are not avail
able)

Quality ID #76: The patient was prepped and draped using maximum sterile barrier technique including 
cap, mask, sterile gown, sterile gloves, a large sterile sheet, hand hygiene, and 2% Chlorhexidine fo
r cutaneous antisepsis. When ultrasound is used, sterile ultrasound techniques are followed requiring
 sterile gel and sterile probes.



TECHNICAL DOCUMENTATION:  JOB ID:  [id number]

 2011 [employer]- All Rights Reserved                           rev-5/18



Reading location - IP/workstation name: [id number]

## 2021-01-02 NOTE — RADIOLOGY REPORT (SQ)
EXAM DESCRIPTION:  FLUORO/CV PLACEMENT; CHEST SINGLE VIEW; U/S GUIDE FOR VASCULAR ACCESS; PICC INSERT
ION



IMAGES COMPLETED DATE/TIME:  1/2/2021 12:32 pm; 1/2/2021 12:13 pm



REASON FOR STUDY:  PICC; PICC Placement; for IV access



COMPARISON:  None.



FLUOROSCOPY TIME:  0

1 images saved to PACS.



TECHNIQUE:  Fluoroscopic and ultrasound guided PICC placement.



LIMITATIONS:  None.



PROCEDURE:  After written consent and assessment were obtained, the patient was brought into the fluo
roscopy room and placed supine on the table. Ultrasound evaluation of potential access sites were per
formed. After successfully identifying a patent right basilic vein, the right arm was prepped and nitish
ped in a sterile fashion along with the ultrasound probe. The entry site was anesthetized with 1% lid
ocaine. A 21 gauge 7 cm needle was advanced through the skin and into the basilic vein under live ult
rasound guidance.  An ultrasound image was saved to PACS confirming access site.  A .018 guide wire w
as then inserted through the needle and into the venous system. The needle was then removed and an 11
 blade scalpel was used to make a 1cm skin incision.  A 5 fr peel-away sheath was advanced over the w
[place] and into the venous system. A measurement was then made using the existing wire and live fluorosc
opic guidance. The wire was then removed and trimmed. The PICC was advanced through the peel-away she
ath and into the venous system. The peel-away sheath was removed and the catheter was adhered to the 
patients arm with a stat lock. The catheter was then aspirated and flushed and a sterile bandage was 
placed over the access site.  A fluoroscopic spot image was saved to PACS confirming the catheter tip
 within the superior vena cava.



IMPRESSION:  SUCCESSFUL PLACEMENT OF A 5 FR DUAL LUMEN 43 CM PICC IN THE RIGHT BASILIC VEIN.



COMMENT:  Patient medication list reviewed: Yes- Quality ID# [id number]:Eligible professional attests to doc
umenting in the medical record they obtained, updated, or reviewed the patient's current medications.
.

Quality :  Final reports for procedures using fluoroscopy that document radiation exposure scott
gregoria, or exposure time and number of fluorographic images (if radiation exposure indices are not avail
able)

Quality ID #76: The patient was prepped and draped using maximum sterile barrier technique including 
cap, mask, sterile gown, sterile gloves, a large sterile sheet, hand hygiene, and 2% Chlorhexidine fo
r cutaneous antisepsis. When ultrasound is used, sterile ultrasound techniques are followed requiring
 sterile gel and sterile probes.



TECHNICAL DOCUMENTATION:  JOB ID:  [id number]

 2011 [employer]- All Rights Reserved                           rev-5/18



Reading location - IP/workstation name: [email or web address]

## 2021-01-03 LAB
ABSOLUTE LYMPHOCYTES# (MANUAL): 0.7 10^3/UL (ref 0.5–4.7)
ABSOLUTE MONOCYTES # (MANUAL): 0.8 10^3/UL (ref 0.1–1.4)
ADD MANUAL DIFF: YES
ALBUMIN SERPL-MCNC: 2.3 G/DL (ref 3.5–5)
ALP SERPL-CCNC: 125 U/L (ref 38–126)
ANION GAP SERPL CALC-SCNC: 5 MMOL/L (ref 5–19)
APPEARANCE UR: CLEAR
APTT BLD: 33.2 SEC (ref 23.5–35.8)
APTT PPP: YELLOW S
ARTERIAL BLOOD FIO2: (no result)
ARTERIAL BLOOD FIO2: (no result)
ARTERIAL BLOOD H2CO3: 1.57 MMOL/L (ref 1.05–1.35)
ARTERIAL BLOOD H2CO3: 1.75 MMOL/L (ref 1.05–1.35)
ARTERIAL BLOOD HCO3: 25.7 MMOL/L (ref 20–24)
ARTERIAL BLOOD HCO3: 27.3 MMOL/L (ref 20–24)
ARTERIAL BLOOD PCO2: 52.2 MMHG (ref 35–45)
ARTERIAL BLOOD PCO2: 58 MMHG (ref 35–45)
ARTERIAL BLOOD PH: 7.27 (ref 7.35–7.45)
ARTERIAL BLOOD PH: 7.34 (ref 7.35–7.45)
ARTERIAL BLOOD PO2: 33 MMHG (ref 80–100)
ARTERIAL BLOOD PO2: 40.9 MMHG (ref 80–100)
ARTERIAL BLOOD TOTAL CO2: 27.5 MMOL/L (ref 23–27)
ARTERIAL BLOOD TOTAL CO2: 28.9 MMOL/L (ref 23–27)
AST SERPL-CCNC: 33 U/L (ref 17–59)
BASE EXCESS BLDA CALC-SCNC: -1.9 MMOL/L
BASE EXCESS BLDA CALC-SCNC: 0.6 MMOL/L
BASOPHILS NFR BLD MANUAL: 0 % (ref 0–2)
BILIRUB DIRECT SERPL-MCNC: 0.3 MG/DL (ref 0–0.4)
BILIRUB SERPL-MCNC: 0.7 MG/DL (ref 0.2–1.3)
BILIRUB UR QL STRIP: NEGATIVE
BUN SERPL-MCNC: 37 MG/DL (ref 7–20)
BURR CELLS BLD QL SMEAR: SLIGHT
CALCIUM: 8 MG/DL (ref 8.4–10.2)
CHLORIDE SERPL-SCNC: 100 MMOL/L (ref 98–107)
CO2 SERPL-SCNC: 32 MMOL/L (ref 22–30)
CRP SERPL-MCNC: 181.6 MG/L (ref ?–10)
D DIMER PPP FEU-MCNC: > 20 UG/ML (ref 0–0.5)
DACRYOCYTES BLD QL SMEAR: SLIGHT
EOSINOPHIL NFR BLD MANUAL: 0 % (ref 0–6)
ERYTHROCYTE [DISTWIDTH] IN BLOOD BY AUTOMATED COUNT: 13 % (ref 11.5–14)
GLUCOSE SERPL-MCNC: 417 MG/DL (ref 75–110)
GLUCOSE UR STRIP-MCNC: >=500 MG/DL
HCT VFR BLD CALC: 35.2 % (ref 37.9–51)
HGB BLD-MCNC: 11.1 G/DL (ref 13.5–17)
INR PPP: 1.53
KETONES UR STRIP-MCNC: NEGATIVE MG/DL
MCH RBC QN AUTO: 28.6 PG (ref 27–33.4)
MCHC RBC AUTO-ENTMCNC: 31.5 G/DL (ref 32–36)
MCV RBC AUTO: 91 FL (ref 80–97)
MONOCYTES % (MANUAL): 5 % (ref 3–13)
NITRITE UR QL STRIP: NEGATIVE
PH UR STRIP: 5 [PH] (ref 5–9)
PHOSPHATE SERPL-MCNC: 3.6 MG/DL (ref 2.5–4.5)
PLATELET # BLD: 118 10^3/UL (ref 150–450)
PLATELET COMMENT: (no result)
POTASSIUM SERPL-SCNC: 5.1 MMOL/L (ref 3.6–5)
PROT SERPL-MCNC: 5.2 G/DL (ref 6.3–8.2)
PROT UR STRIP-MCNC: 30 MG/DL
PROTHROMBIN TIME: 18.6 SEC (ref 11.4–15.4)
RBC # BLD AUTO: 3.88 10^6/UL (ref 4.35–5.55)
SAO2 % BLDA: 58.8 % (ref 94–98)
SAO2 % BLDA: 67.9 % (ref 94–98)
SCHISTOCYTES BLD QL SMEAR: SLIGHT
SEGMENTED NEUTROPHILS % (MAN): 91 % (ref 42–78)
SP GR UR STRIP: 1.02
TOTAL CELLS COUNTED BLD: 100
UROBILINOGEN UR-MCNC: NEGATIVE MG/DL (ref ?–2)
VARIANT LYMPHS NFR BLD MANUAL: 4 % (ref 13–45)
WBC # BLD AUTO: 16.6 10^3/UL (ref 4–10.5)

## 2021-01-03 PROCEDURE — 5A1945Z RESPIRATORY VENTILATION, 24-96 CONSECUTIVE HOURS: ICD-10-PCS | Performed by: HOSPITALIST

## 2021-01-03 PROCEDURE — 0BH17EZ INSERTION OF ENDOTRACHEAL AIRWAY INTO TRACHEA, VIA NATURAL OR ARTIFICIAL OPENING: ICD-10-PCS | Performed by: HOSPITALIST

## 2021-01-03 RX ADMIN — Medication SCH: at 09:09

## 2021-01-03 RX ADMIN — Medication SCH: at 04:40

## 2021-01-03 RX ADMIN — DEXAMETHASONE SODIUM PHOSPHATE SCH MG: 10 INJECTION INTRAMUSCULAR; INTRAVENOUS at 09:46

## 2021-01-03 RX ADMIN — FENTANYL CITRATE PRN MLS/HR: 50 INJECTION INTRAVENOUS at 17:23

## 2021-01-03 RX ADMIN — INSULIN HUMAN PRN MLS/HR: 100 INJECTION, SOLUTION PARENTERAL at 16:30

## 2021-01-03 RX ADMIN — OXYCODONE HYDROCHLORIDE AND ACETAMINOPHEN SCH MG: 500 TABLET ORAL at 23:51

## 2021-01-03 RX ADMIN — Medication SCH ML: at 14:51

## 2021-01-03 RX ADMIN — VITAMIN D, TAB 1000IU (100/BT) SCH: 25 TAB at 09:09

## 2021-01-03 RX ADMIN — ETOMIDATE ONE MG: 2 INJECTION, SOLUTION INTRAVENOUS at 09:51

## 2021-01-03 RX ADMIN — DOCUSATE SODIUM SCH: 100 CAPSULE, LIQUID FILLED ORAL at 11:55

## 2021-01-03 RX ADMIN — Medication SCH: at 11:00

## 2021-01-03 RX ADMIN — Medication SCH TAB: at 12:31

## 2021-01-03 RX ADMIN — OXYCODONE HYDROCHLORIDE AND ACETAMINOPHEN SCH MG: 500 TABLET ORAL at 17:27

## 2021-01-03 RX ADMIN — FENTANYL CITRATE PRN MLS/HR: 50 INJECTION INTRAVENOUS at 20:00

## 2021-01-03 RX ADMIN — DEXMEDETOMIDINE HYDROCHLORIDE PRN MLS/HR: 4 INJECTION, SOLUTION INTRAVENOUS at 10:10

## 2021-01-03 RX ADMIN — Medication SCH: at 11:55

## 2021-01-03 RX ADMIN — VITAMIN D, TAB 1000IU (100/BT) SCH: 25 TAB at 11:55

## 2021-01-03 RX ADMIN — HEPARIN SODIUM PRN MLS/HR: 10000 INJECTION, SOLUTION INTRAVENOUS at 08:39

## 2021-01-03 RX ADMIN — NYSTATIN SCH UNIT: 100000 SUSPENSION ORAL at 23:50

## 2021-01-03 RX ADMIN — Medication SCH MG: at 23:50

## 2021-01-03 RX ADMIN — NOREPINEPHRINE BITARTRATE PRN MLS/HR: 1 INJECTION, SOLUTION, CONCENTRATE INTRAVENOUS at 11:10

## 2021-01-03 RX ADMIN — NYSTATIN SCH UNIT: 100000 SUSPENSION ORAL at 14:50

## 2021-01-03 RX ADMIN — FENTANYL CITRATE PRN MLS/HR: 50 INJECTION INTRAVENOUS at 11:53

## 2021-01-03 RX ADMIN — DOCUSATE SODIUM SCH MG: 50 LIQUID ORAL at 17:28

## 2021-01-03 RX ADMIN — INSULIN HUMAN SCH: 100 INJECTION, SOLUTION PARENTERAL at 09:09

## 2021-01-03 RX ADMIN — LEVOTHYROXINE SODIUM SCH MG: 50 TABLET ORAL at 12:31

## 2021-01-03 RX ADMIN — FENTANYL CITRATE PRN MCG: 50 INJECTION INTRAMUSCULAR; INTRAVENOUS at 08:56

## 2021-01-03 RX ADMIN — FENTANYL CITRATE PRN MLS/HR: 50 INJECTION INTRAVENOUS at 21:35

## 2021-01-03 RX ADMIN — Medication SCH ML: at 23:51

## 2021-01-03 RX ADMIN — FENTANYL CITRATE PRN MCG: 50 INJECTION INTRAMUSCULAR; INTRAVENOUS at 04:38

## 2021-01-03 RX ADMIN — NOREPINEPHRINE BITARTRATE PRN MLS/HR: 1 INJECTION, SOLUTION, CONCENTRATE INTRAVENOUS at 16:45

## 2021-01-03 RX ADMIN — LEUCINE, PHENYLALANINE, LYSINE, METHIONINE, ISOLEUCINE, VALINE, HISTIDINE, THREONINE, TRYPTOPHAN, ALANINE, GLYCINE, ARGININE, PROLINE, SERINE, TYROSINE, DEXTROSE PRN MLS/HR: 365; 280; 290; 200; 300; 290; 240; 210; 90; 1035; 515; 575; 340; 250; 20; 20 INJECTION INTRAVENOUS at 16:34

## 2021-01-03 RX ADMIN — DEXMEDETOMIDINE HYDROCHLORIDE PRN MLS/HR: 4 INJECTION, SOLUTION INTRAVENOUS at 15:05

## 2021-01-03 RX ADMIN — DOCUSATE SODIUM SCH MG: 50 LIQUID ORAL at 11:52

## 2021-01-03 RX ADMIN — INSULIN HUMAN SCH: 100 INJECTION, SOLUTION PARENTERAL at 05:19

## 2021-01-03 RX ADMIN — DOCUSATE SODIUM SCH: 100 CAPSULE, LIQUID FILLED ORAL at 09:09

## 2021-01-03 RX ADMIN — NOREPINEPHRINE BITARTRATE PRN MLS/HR: 1 INJECTION, SOLUTION, CONCENTRATE INTRAVENOUS at 22:35

## 2021-01-03 RX ADMIN — INSULIN HUMAN SCH UNIT: 100 INJECTION, SOLUTION PARENTERAL at 08:57

## 2021-01-03 RX ADMIN — NYSTATIN SCH UNIT: 100000 SUSPENSION ORAL at 11:52

## 2021-01-03 RX ADMIN — NYSTATIN SCH UNIT: 100000 SUSPENSION ORAL at 17:27

## 2021-01-03 RX ADMIN — OXYCODONE HYDROCHLORIDE AND ACETAMINOPHEN SCH: 500 TABLET ORAL at 04:39

## 2021-01-03 RX ADMIN — ETOMIDATE ONE: 2 INJECTION, SOLUTION INTRAVENOUS at 15:07

## 2021-01-03 RX ADMIN — OXYCODONE HYDROCHLORIDE AND ACETAMINOPHEN SCH MG: 500 TABLET ORAL at 11:52

## 2021-01-03 RX ADMIN — VITAMIN D, TAB 1000IU (100/BT) SCH UNIT: 25 TAB at 11:52

## 2021-01-03 RX ADMIN — OXYCODONE HYDROCHLORIDE AND ACETAMINOPHEN SCH: 500 TABLET ORAL at 05:33

## 2021-01-03 RX ADMIN — INSULIN HUMAN SCH UNIT: 100 INJECTION, SOLUTION PARENTERAL at 14:56

## 2021-01-03 RX ADMIN — Medication SCH: at 14:57

## 2021-01-03 NOTE — PDOC CRITICAL CARE PROG REPORT
General


Date:: 01/03/21


ICU Day:: 2


Ventilator Day:: 1


Hospital Day:: 9


Resuscitation Status: Full Code


Events in the past 12 to 24 Hours:: 





Increasing hypoxia, possible need for ICU.





1/3/21: Intubated for increased WOB. Lower O2 saturations and decreasing mental 

status.


Review of systems relevant to events:: 





Pulmonary


Reason for ICU Addmission:: Intubated for Covid PNA.





- Medications:


Medications reviewed and adjusted accordingly: Yes


Vasopressors:: 





None


Sedation:: 





Precedex.





Physical Exam


Vital Signs: 


                                        











Temp Pulse Resp BP Pulse Ox


 


 101.1 F H  157 H  19   168/96 H  71 L


 


 01/03/21 08:00  01/03/21 08:00  01/03/21 09:04  01/03/21 08:42  01/03/21 09:04








                                 Intake & Output











 01/02/21 01/03/21 01/04/21





 06:59 06:59 06:59


 


Intake Total 2700 1413 


 


Output Total 1050 1845 240


 


Balance 1650 -432 -240


 


Weight 70.1 kg 72.3 kg 








                                  Weight/Height





Weight                           72.3 kg


Height                           5 ft 5 in








General appearance: PRESENT: mild distress, thin


Head exam: PRESENT: atraumatic, normocephalic


Eye exam: PRESENT: conjunctiva pink, EOMI, PERRLA.  ABSENT: scleral icterus


Ear exam: PRESENT: normal external ear exam


Mouth exam: PRESENT: moist, tongue midline


Neck exam: ABSENT: carotid bruit, JVD, lymphadenopathy, thyromegaly


Respiratory exam: PRESENT: clear to auscultation iris, decreased breath sounds, 

retraction, symmetrical, tachypnea.  ABSENT: rales, rhonchi, wheezes


Cardiovascular exam: PRESENT: RRR.  ABSENT: diastolic murmur, rubs, systolic 

murmur


GI/Abdominal exam: PRESENT: normal bowel sounds, soft.  ABSENT: distended, 

guarding, mass, organolmegaly, rebound, tenderness


Rectal exam: PRESENT: deferred


Gentrourinary exam: PRESENT: indwelling catheter


Extremities exam: PRESENT: full ROM.  ABSENT: calf tenderness, clubbing, pedal 

edema


Musculoskeletal exam: PRESENT: normal inspection


Neurological exam: PRESENT: alert, altered, awake, CN II-XII grossly intact, 

other - Although awake he has a far away look in his eyes and takes longer than 

usual to respond. A change in mental status.


Skin exam: PRESENT: dry, intact, warm.  ABSENT: cyanosis, rash


Tubes/Lines: PRESENT: Endotracheal Tube, Nasogastic Tube





Laboratory/Radiographs


Laboratory Results: 


                                        





                                 01/03/21 00:10 





                                 01/03/21 00:10 





                                        











  01/03/21 01/03/21 01/03/21





  00:10 00:10 00:10


 


WBC   16.6 H 


 


RBC   3.88 L 


 


Hgb   11.1 L 


 


Hct   35.2 L 


 


MCV   91 


 


MCH   28.6 


 


MCHC   31.5 L 


 


RDW   13.0 


 


Plt Count   118 L 


 


Seg Neutrophils %   Not Reportable 


 


Carbonic Acid    1.57 H


 


HCO3/H2CO3 Ratio    17:1


 


ABG pH    7.34 L


 


ABG pCO2    52.2 H


 


ABG pO2    33.0 L*


 


ABG HCO3    27.3 H


 


ABG O2 Saturation    58.8 L


 


ABG Base Excess    0.6


 


FiO2    100%


 


Sodium  136.5 L  


 


Potassium  5.1 H  


 


Chloride  100  


 


Carbon Dioxide  32 H  


 


Anion Gap  5  


 


BUN  37 H  


 


Creatinine  1.06  


 


Est GFR ( Amer)  > 60  


 


Est GFR (Non-Af Amer)   


 


Glucose  417 H*  


 


Calcium  8.0 L  


 


Phosphorus  3.6  


 


Magnesium  2.7 H  


 


Total Bilirubin  0.7  


 


AST  33  


 


Alkaline Phosphatase  125  


 


C-Reactive Protein  181.6 H  


 


Total Protein  5.2 L  


 


Albumin  2.3 L  


 


Prealbumin   


 


Urine Color   


 


Urine Appearance   


 


Urine pH   


 


Ur Specific Gravity   


 


Urine Protein   


 


Urine Glucose (UA)   


 


Urine Ketones   


 


Urine Blood   


 


Urine Nitrite   


 


Ur Leukocyte Esterase   


 


Urine WBC (Auto)   


 


Urine RBC (Auto)   














  01/03/21 01/03/21





  00:10 08:35


 


WBC  


 


RBC  


 


Hgb  


 


Hct  


 


MCV  


 


MCH  


 


MCHC  


 


RDW  


 


Plt Count  


 


Seg Neutrophils %  


 


Carbonic Acid  


 


HCO3/H2CO3 Ratio  


 


ABG pH  


 


ABG pCO2  


 


ABG pO2  


 


ABG HCO3  


 


ABG O2 Saturation  


 


ABG Base Excess  


 


FiO2  


 


Sodium  Cancelled 


 


Potassium  Cancelled 


 


Chloride  Cancelled 


 


Carbon Dioxide  Cancelled 


 


Anion Gap  Cancelled 


 


BUN  Cancelled 


 


Creatinine  Cancelled 


 


Est GFR ( Amer)  Cancelled 


 


Est GFR (Non-Af Amer)  Cancelled 


 


Glucose  Cancelled 


 


Calcium  Cancelled 


 


Phosphorus  Cancelled 


 


Magnesium  


 


Total Bilirubin  Cancelled 


 


AST  Cancelled 


 


Alkaline Phosphatase  Cancelled 


 


C-Reactive Protein  


 


Total Protein  Cancelled 


 


Albumin  Cancelled 


 


Prealbumin  9.3 L 


 


Urine Color   YELLOW


 


Urine Appearance   CLEAR


 


Urine pH   5.0


 


Ur Specific Gravity   1.023


 


Urine Protein   30 H


 


Urine Glucose (UA)   >=500 H


 


Urine Ketones   NEGATIVE


 


Urine Blood   LARGE H


 


Urine Nitrite   NEGATIVE


 


Ur Leukocyte Esterase   NEGATIVE


 


Urine WBC (Auto)   3


 


Urine RBC (Auto)   71








                                        











  01/03/21





  00:10


 


Troponin I  0.015











Impressions: 


                                        





Guidance Fluoroscopy  01/02/21 00:00


IMPRESSION:  SUCCESSFUL PLACEMENT OF A 5 FR DUAL LUMEN 43 CM PICC IN THE RIGHT 

BASILIC VEIN.


 








Interventional Vascular Procedure  01/02/21 00:00


IMPRESSION:  SUCCESSFUL PLACEMENT OF A 5 FR DUAL LUMEN 43 CM PICC IN THE RIGHT 

BASILIC VEIN.


 








PICC Line Insertion  01/02/21 00:00


IMPRESSION:  SUCCESSFUL PLACEMENT OF A 5 FR DUAL LUMEN 43 CM PICC IN THE RIGHT 

BASILIC VEIN.


 











All labs, radiographs, diagnostic studies and EKGs were personally reviewed: Yes


In addition, reports of radiographic and diagnostic studies were read: Yes





Assessment and Plan





- Diagnosis


(1) Pneumonia due to COVID-19 virus


Is this a current diagnosis for this admission?: Yes   


Plan: 


Due to decreaseing mental status the patient was intubated before a respiratory 

arrest or other emergency. He no longer was a 'happy hypoxic' as before.








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


Is this a current diagnosis for this admission?: Yes   


Plan: 


Still not well controlled








(3) Nutrition deficiency due to insufficient food


Is this a current diagnosis for this admission?: Yes   


Plan: 


Now that he is intubated will stop TPN and begin tube feeds.





Plan Summary: 





He met criteria for Covid intubation with decreasing MS. This does increase his 

mortality level some which at his age is already high.





Critical Time


Critical Time (minutes): 45


Level of Care: ICU


Anticipated discharge: SNF


Anticipated DC Timeframe: Other


-: 


1.  The care of a critical patient is a dynamic process.  This note is a 

representative synopsis but static in nature.  The timeframe for treatments 

given in order is not necessarily the actual time these treatments may have been

done.





2.  This patient requires critical care secondary to ongoing requirements for 

therapy not offered or safe outside the critical care environment.  Transfer to 

a lower level of care will result in altered life or limb morbidity and 

mortality.





3.  Multidisciplinary rounds completed.





4.  ABCDE bundle addressed.

## 2021-01-03 NOTE — PROGRESS NOTE
Provider Note


Provider Note: 





ICU Interim Evaluation Note:





Out of concern for pulmonary embolism or microthromboembolism contributing to 

ongoing hypoxemia, both of which are known to be common sequelae from COVID-19, 

I ordered a repeat D-dimer test given that the patient is too unstable to go for

computed tomography. The result returned extremely high at >20 ug/mL (which is 

the lab cut off value).





Assessment:


High probability of pulmonary embolism vs microthromboembolism due to COVID-19 

infection


Acute respiratory failure with hypoxia


Refractory hypoxemia on non-invasive ventilation


ARDS


Thrombocytopenia of unclear etiology, though could be drug-related








For now, Mr Michael remains classified as a "happy hypoxic" given that he is orient

ed x4, with a normal troponin as well as hepatic and renal function by serum 

biomarkers. His ScVO2 is 59% (labeled as ABG in the lab but is actually a VBG) 

indicating his tissues are extracting slightly more oxygen than in a normal 

physiologic state of health. 





Plan:


-Start a heparin infusion and follow thrombocytopenia vigilantly. My hope is 

that the heparin infusion may assist with oxygenation and be able to keep Mr Michael from invasive mechanical ventilation longer. He is at extremely high risk 

of mortality based on his COVID serum biomarkers and given his age.


-CBC in AM


-D/C Lovenox

## 2021-01-03 NOTE — RADIOLOGY REPORT (SQ)
EXAM DESCRIPTION:  CHEST SINGLE VIEW



IMAGES COMPLETED DATE/TIME:  1/3/2021 10:13 am



REASON FOR STUDY:  ETT placed



COMPARISON:  1/2/2021



FINDINGS:  One view chest AP portable semi upright 4 central line and endotracheal placement.

This study is limited, the lung apices are excluded from the field of view.

A nasogastric tube is in place and appropriately located.

Suspect an endotracheal to just above the level of the aorta but this is not confirmed and further ch
est imaging may be necessary to include the lower neck and upper lung fields.

Right PICC line in place as before.

Coarse bilateral largely upper lung field infiltrates are once again noted without change.



TECHNICAL DOCUMENTATION:  JOB ID:  [id number]



Reading location - IP/workstation name: [email or web address]

## 2021-01-03 NOTE — RADIOLOGY REPORT (SQ)
EXAM DESCRIPTION:  CHEST SINGLE VIEW



IMAGES COMPLETED DATE/TIME:  1/3/2021 3:57 pm



REASON FOR STUDY:  ETT verification



COMPARISON:  1/3/2021 earlier.



FINDINGS:  Single-view chest for endotracheal tube placement, AP portable upright.

Appropriate endotracheal tube.  Nasogastric tube down.  Right PICC line in place.

Coarse parenchymal infiltrates in the lungs persist, similar appearance.



TECHNICAL DOCUMENTATION:  JOB ID:  [id number]



Reading location - IP/workstation name: [email or web address]

## 2021-01-03 NOTE — OPERATIVE REPORT
Bedside Procedure





- History of Present Illness


History of Present Illness: 


SERENITY DIAZ is a 85 year old male patient of Dr. Marino who presented to the

ED with recent diagnosis of positive status for COVID-19 infection and worsening

shortness of breath, particularly with exertion. Patient reported associated 

minimally productive cough, loss of smell and taste over last one week. He 

claimed associated worsening generalized weakness. He admitted exposure to his 

granddaughter was was recently diagnosed with corona virus infection. He denied 

any diarrhea or abdominal pain. No chest pain or palpitation. His initial ED 

evaluation was significant for elevation of inflammatory indices and D-Dimer, 

electrolytes derangement, and chest X ray suggestive of left lower lobe airspace

disease process. He was advised hospitalization for further evaluation and 

management. His morbidities are as listed below.





1/2/21: Asked to see patient for worsening hypoxia. Pt seen yesterday and not as

hypoxic. Awake and talking although SOB. Same for today but with O2 saturations 

in 70s. He is a high risk for intubation, however he has never been tried in the

proned position to try and remove some WOB and increase oxygenation. There sre 

some studies suggesting this be tried before intubation in spontaneously 

breathing patients.


Indication for Procedure: Increasing mental status changes, hypoxia.


Date: 01/03/21


Provider: OMA ANAND





- Intubation


  ** Orotracheal


Time of Intubation: 10:00


Airway evaluation: Normal anatomy


Mallampati Classification: Class 1


Medications: Etomidate, Succinylcholine


Intubation method: Orotracheal


Blade type: Emeterio


Blade size: 4


Equipment used: Glidescope


ETT size: 7.5


ETT secured at: Teeth


ETT secured at (cm): 23


Post Intubation Xray: Yes


Intubation Complications: No complications

## 2021-01-04 LAB
ALBUMIN SERPL-MCNC: 2.2 G/DL (ref 3.5–5)
ALP SERPL-CCNC: 111 U/L (ref 38–126)
ANION GAP SERPL CALC-SCNC: 5 MMOL/L (ref 5–19)
APPEARANCE UR: (no result)
APTT PPP: (no result) S
ARTERIAL BLOOD FIO2: (no result)
ARTERIAL BLOOD FIO2: (no result)
ARTERIAL BLOOD H2CO3: 2.01 MMOL/L (ref 1.05–1.35)
ARTERIAL BLOOD H2CO3: 2.79 MMOL/L (ref 1.05–1.35)
ARTERIAL BLOOD HCO3: 29.5 MMOL/L (ref 20–24)
ARTERIAL BLOOD HCO3: 32.5 MMOL/L (ref 20–24)
ARTERIAL BLOOD PCO2: 66.8 MMHG (ref 35–45)
ARTERIAL BLOOD PCO2: 92.7 MMHG (ref 35–45)
ARTERIAL BLOOD PH: 7.16 (ref 7.35–7.45)
ARTERIAL BLOOD PH: 7.26 (ref 7.35–7.45)
ARTERIAL BLOOD PO2: 60.9 MMHG (ref 80–100)
ARTERIAL BLOOD PO2: 66.2 MMHG (ref 80–100)
ARTERIAL BLOOD TOTAL CO2: 31.6 MMOL/L (ref 23–27)
ARTERIAL BLOOD TOTAL CO2: 35.3 MMOL/L (ref 23–27)
AST SERPL-CCNC: 33 U/L (ref 17–59)
BASE EXCESS BLDA CALC-SCNC: 1.3 MMOL/L
BASE EXCESS BLDA CALC-SCNC: 1.6 MMOL/L
BILIRUB DIRECT SERPL-MCNC: 0.3 MG/DL (ref 0–0.4)
BILIRUB SERPL-MCNC: 0.4 MG/DL (ref 0.2–1.3)
BILIRUB UR QL STRIP: NEGATIVE
BUN SERPL-MCNC: 61 MG/DL (ref 7–20)
CALCIUM: 8.1 MG/DL (ref 8.4–10.2)
CHLORIDE SERPL-SCNC: 100 MMOL/L (ref 98–107)
CO2 SERPL-SCNC: 31 MMOL/L (ref 22–30)
CREAT UR-MCNC: 141.5 MG/DL (ref 22–328)
ERYTHROCYTE [DISTWIDTH] IN BLOOD BY AUTOMATED COUNT: 13.3 % (ref 11.5–14)
ERYTHROCYTE [DISTWIDTH] IN BLOOD BY AUTOMATED COUNT: 13.4 % (ref 11.5–14)
GLUCOSE SERPL-MCNC: 158 MG/DL (ref 75–110)
GLUCOSE UR STRIP-MCNC: 50 MG/DL
HCT VFR BLD CALC: 29.6 % (ref 37.9–51)
HCT VFR BLD CALC: 32.7 % (ref 37.9–51)
HGB BLD-MCNC: 10.1 G/DL (ref 13.5–17)
HGB BLD-MCNC: 9.5 G/DL (ref 13.5–17)
INR PPP: 1.34
KETONES UR STRIP-MCNC: NEGATIVE MG/DL
MCH RBC QN AUTO: 28.1 PG (ref 27–33.4)
MCH RBC QN AUTO: 29.3 PG (ref 27–33.4)
MCHC RBC AUTO-ENTMCNC: 30.8 G/DL (ref 32–36)
MCHC RBC AUTO-ENTMCNC: 32.2 G/DL (ref 32–36)
MCV RBC AUTO: 91 FL (ref 80–97)
MCV RBC AUTO: 91 FL (ref 80–97)
NITRITE UR QL STRIP: NEGATIVE
PH UR STRIP: 5 [PH] (ref 5–9)
PHOSPHATE SERPL-MCNC: 4.8 MG/DL (ref 2.5–4.5)
PLATELET # BLD: 107 10^3/UL (ref 150–450)
PLATELET # BLD: 117 10^3/UL (ref 150–450)
POTASSIUM SERPL-SCNC: 5.1 MMOL/L (ref 3.6–5)
PREALB SERPL-MCNC: 9 MG/DL (ref 17.6–36)
PROT SERPL-MCNC: 5.1 G/DL (ref 6.3–8.2)
PROT UR STRIP-MCNC: 30 MG/DL
PROTHROMBIN TIME: 16.8 SEC (ref 11.4–15.4)
RBC # BLD AUTO: 3.25 10^6/UL (ref 4.35–5.55)
RBC # BLD AUTO: 3.6 10^6/UL (ref 4.35–5.55)
SAO2 % BLDA: 82.6 % (ref 94–98)
SAO2 % BLDA: 89.6 % (ref 94–98)
SP GR UR STRIP: 1.02
URINE SODIUM: 17 MMOL/L (ref 30–90)
UROBILINOGEN UR-MCNC: 2 MG/DL (ref ?–2)
WBC # BLD AUTO: 23.3 10^3/UL (ref 4–10.5)
WBC # BLD AUTO: 24.9 10^3/UL (ref 4–10.5)

## 2021-01-04 PROCEDURE — 03HC33Z INSERTION OF INFUSION DEVICE INTO LEFT RADIAL ARTERY, PERCUTANEOUS APPROACH: ICD-10-PCS | Performed by: HOSPITALIST

## 2021-01-04 RX ADMIN — DOCUSATE SODIUM SCH MG: 50 LIQUID ORAL at 11:47

## 2021-01-04 RX ADMIN — PROPOFOL PRN MLS/HR: 10 INJECTION, EMULSION INTRAVENOUS at 19:36

## 2021-01-04 RX ADMIN — DEXAMETHASONE SODIUM PHOSPHATE SCH MG: 10 INJECTION INTRAMUSCULAR; INTRAVENOUS at 11:47

## 2021-01-04 RX ADMIN — Medication SCH: at 08:34

## 2021-01-04 RX ADMIN — PROPOFOL PRN MLS/HR: 10 INJECTION, EMULSION INTRAVENOUS at 02:30

## 2021-01-04 RX ADMIN — Medication SCH: at 21:01

## 2021-01-04 RX ADMIN — NOREPINEPHRINE BITARTRATE PRN MLS/HR: 1 INJECTION, SOLUTION, CONCENTRATE INTRAVENOUS at 10:46

## 2021-01-04 RX ADMIN — NOREPINEPHRINE BITARTRATE PRN MLS/HR: 1 INJECTION, SOLUTION, CONCENTRATE INTRAVENOUS at 17:48

## 2021-01-04 RX ADMIN — Medication SCH: at 11:48

## 2021-01-04 RX ADMIN — INSULIN HUMAN PRN MLS/HR: 100 INJECTION, SOLUTION PARENTERAL at 20:48

## 2021-01-04 RX ADMIN — NOREPINEPHRINE BITARTRATE PRN MLS/HR: 1 INJECTION, SOLUTION, CONCENTRATE INTRAVENOUS at 04:10

## 2021-01-04 RX ADMIN — ALBUMIN (HUMAN) SCH MLS/HR: 12.5 SOLUTION INTRAVENOUS at 07:00

## 2021-01-04 RX ADMIN — DOCUSATE SODIUM SCH MG: 50 LIQUID ORAL at 19:08

## 2021-01-04 RX ADMIN — ALBUMIN (HUMAN) SCH MLS/HR: 12.5 SOLUTION INTRAVENOUS at 14:07

## 2021-01-04 RX ADMIN — NYSTATIN SCH UNIT: 100000 SUSPENSION ORAL at 21:00

## 2021-01-04 RX ADMIN — MINERAL OIL AND WHITE PETROLATUM SCH: 150; 830 OINTMENT OPHTHALMIC at 21:01

## 2021-01-04 RX ADMIN — FENTANYL CITRATE PRN MLS/HR: 50 INJECTION INTRAVENOUS at 22:16

## 2021-01-04 RX ADMIN — MINERAL OIL AND WHITE PETROLATUM SCH: 150; 830 OINTMENT OPHTHALMIC at 19:09

## 2021-01-04 RX ADMIN — VITAMIN D, TAB 1000IU (100/BT) SCH UNIT: 25 TAB at 11:47

## 2021-01-04 RX ADMIN — FENTANYL CITRATE PRN MLS/HR: 50 INJECTION INTRAVENOUS at 18:30

## 2021-01-04 RX ADMIN — Medication SCH MG: at 21:00

## 2021-01-04 RX ADMIN — Medication SCH ML: at 14:08

## 2021-01-04 RX ADMIN — ALBUMIN (HUMAN) SCH MLS/HR: 12.5 SOLUTION INTRAVENOUS at 08:37

## 2021-01-04 RX ADMIN — OXYCODONE HYDROCHLORIDE AND ACETAMINOPHEN SCH MG: 500 TABLET ORAL at 11:47

## 2021-01-04 RX ADMIN — OXYCODONE HYDROCHLORIDE AND ACETAMINOPHEN SCH MG: 500 TABLET ORAL at 23:31

## 2021-01-04 RX ADMIN — NYSTATIN SCH UNIT: 100000 SUSPENSION ORAL at 14:08

## 2021-01-04 RX ADMIN — INSULIN HUMAN PRN MLS/HR: 100 INJECTION, SOLUTION PARENTERAL at 08:27

## 2021-01-04 RX ADMIN — FENTANYL CITRATE PRN MLS/HR: 50 INJECTION INTRAVENOUS at 06:45

## 2021-01-04 RX ADMIN — NYSTATIN SCH UNIT: 100000 SUSPENSION ORAL at 11:48

## 2021-01-04 RX ADMIN — NYSTATIN SCH UNIT: 100000 SUSPENSION ORAL at 19:08

## 2021-01-04 RX ADMIN — OXYCODONE HYDROCHLORIDE AND ACETAMINOPHEN SCH MG: 500 TABLET ORAL at 08:34

## 2021-01-04 RX ADMIN — ALBUMIN (HUMAN) SCH MLS/HR: 12.5 SOLUTION INTRAVENOUS at 11:58

## 2021-01-04 RX ADMIN — FENTANYL CITRATE PRN MLS/HR: 50 INJECTION INTRAVENOUS at 00:45

## 2021-01-04 RX ADMIN — OXYCODONE HYDROCHLORIDE AND ACETAMINOPHEN SCH MG: 500 TABLET ORAL at 19:08

## 2021-01-04 RX ADMIN — LEUCINE, PHENYLALANINE, LYSINE, METHIONINE, ISOLEUCINE, VALINE, HISTIDINE, THREONINE, TRYPTOPHAN, ALANINE, GLYCINE, ARGININE, PROLINE, SERINE, TYROSINE, DEXTROSE PRN MLS/HR: 365; 280; 290; 200; 300; 290; 240; 210; 90; 1035; 515; 575; 340; 250; 20; 20 INJECTION INTRAVENOUS at 16:14

## 2021-01-04 RX ADMIN — NOREPINEPHRINE BITARTRATE PRN MLS/HR: 1 INJECTION, SOLUTION, CONCENTRATE INTRAVENOUS at 09:45

## 2021-01-04 RX ADMIN — FENTANYL CITRATE PRN MLS/HR: 50 INJECTION INTRAVENOUS at 11:49

## 2021-01-04 RX ADMIN — Medication SCH TAB: at 11:53

## 2021-01-04 RX ADMIN — HEPARIN SODIUM PRN MLS/HR: 10000 INJECTION, SOLUTION INTRAVENOUS at 14:51

## 2021-01-04 RX ADMIN — FENTANYL CITRATE PRN MLS/HR: 50 INJECTION INTRAVENOUS at 03:15

## 2021-01-04 RX ADMIN — FENTANYL CITRATE PRN MLS/HR: 50 INJECTION INTRAVENOUS at 14:32

## 2021-01-04 RX ADMIN — LEVOTHYROXINE SODIUM SCH MG: 50 TABLET ORAL at 08:33

## 2021-01-04 NOTE — OPERATIVE REPORT
Bedside Procedure





- History of Present Illness


History of Present Illness: 


SERENITY DIAZ is a 85 year old male patient of Dr. Marino who presented to the

ED with recent diagnosis of positive status for COVID-19 infection and worsening

shortness of breath, particularly with exertion. Patient reported associated 

minimally productive cough, loss of smell and taste over last one week. He 

claimed associated worsening generalized weakness. He admitted exposure to his 

granddaughter was was recently diagnosed with corona virus infection. He denied 

any diarrhea or abdominal pain. No chest pain or palpitation. His initial ED 

evaluation was significant for elevation of inflammatory indices and D-Dimer, 

electrolytes derangement, and chest X ray suggestive of left lower lobe airspace

disease process. He was advised hospitalization for further evaluation and 

management. His morbidities are as listed below.





Acute respiratory failure due to hypoxia for which he was intubated earlier 

today and also hemodynamic instability on 12 mcg/min of Norepinephrine. 








PROCEDURE: ARTERIAL LINE INSERTION


PROCEDURALIST: BEE KIRK


INDICATION: HEMODYNAMIC INSTABILITY & FREQUENT ABG ANALYSIS


SUCCESSFUL: YES


COMPLICATIONS: NONE


ANESTHESIA: 3 ML 1% LIDOCAINE





DETAILS:


Patient placed in proper procedural position followed by prepping and draping in

usual sterile fashion.  Utilizing ultrasound with a sterile probe cover, the 

left radial artery was identified and appears of normal structure and function. 

The dermis and subcutaneous tissue were anesthetized with 3 mL of 1% lidocaine. 

Next, a 22-gauge needle was visualized entering the left radial artery via 

ultrasound with a return of pulsatile blood.  A guide wire was then advanced 

through the needle into the artery and the needle was moved.  The guide wire was

confirmed to be in the left radial artery and 2 views via ultrasound.  A small 

skin stab incision was made on top of the wire to prepare for the catheter.  

Next, a 20-gauge 1 and three-quarter inch catheter was advanced over the wire 

into the left radial artery and the guidewire was subsequently removed, again 

noting pulsatile blood from the distal end of the catheter.  The catheter was 

sutured into place and transducer tubing was hooked up to the catheter in usual 

fashion.  Sterile transparent occlusive dressing was applied.  Patient already 

procedure well, no complications.  Good arterial waveform on the monitor.





EBL 3 mL


Indication for Procedure: Hemodynamic instability & frequent ABG analysis


Date: 01/03/21


Provider: AAKASH ABRAHAM

## 2021-01-04 NOTE — PDOC CRITICAL CARE PROG REPORT
General


Date:: 01/04/21


ICU Day:: 3


Ventilator Day:: 2


Hospital Day:: 10


Resuscitation Status: Full Code


Events in the past 12 to 24 Hours:: 





Increasing hypoxia, possible need for ICU.





1/3/21: Intubated for increased WOB. Lower O2 saturations and decreasing mental 

status.





1/4: Remains intubated.  proned @ 0200.  on norepinephrine, weaning.  On 

propofol/fentanyl for sedation.  Got intermittent doses of rocuronium overnight.

 Afebrile overnight.  WBC 24.9, increasing.  On steroids.  On heparin.  on 

insulin.








Review of systems relevant to events:: 





Pulmonary


Reason for ICU Addmission:: Intubated for Covid PNA.





- Medications:


Medications reviewed and adjusted accordingly: Yes


Vasopressors:: 





norepinephrine





Physical Exam


Vital Signs: 


                                        











Temp Pulse Resp BP Pulse Ox


 


 98.4 F   80   34 H  115/68   93 


 


 01/04/21 06:00  01/04/21 00:00  01/04/21 08:00  01/04/21 00:00  01/04/21 08:00








                                 Intake & Output











 01/03/21 01/04/21 01/05/21





 06:59 06:59 06:59


 


Intake Total 1413 1979 50


 


Output Total 1845 610 


 


Balance -432 1369 50


 


Weight 72.3 kg 69.8 kg 








                                  Weight/Height





Weight                           69.8 kg


Height                           1.65 m








General appearance: PRESENT: no acute distress, well-developed, well-nourished


Head exam: PRESENT: atraumatic, normocephalic


Eye exam: PRESENT: conjunctiva pink, EOMI, PERRLA.  ABSENT: scleral icterus


Respiratory exam: PRESENT: clear to auscultation iris.  ABSENT: rales, rhonchi, 

wheezes


Cardiovascular exam: PRESENT: RRR.  ABSENT: diastolic murmur, rubs, systolic 

murmur


Gentrourinary exam: PRESENT: indwelling catheter


Extremities exam: PRESENT: full ROM.  ABSENT: calf tenderness, clubbing, pedal 

edema


Neurological exam: PRESENT: altered


Psychiatric exam: ABSENT: agitated, anxious


Tubes/Lines: PRESENT: Endotracheal Tube, Central Line - PICC, Arterial Catheter 

- L radial





Laboratory/Radiographs


Laboratory Results: 


                                        





                                 01/04/21 05:30 





                                 01/04/21 05:30 





                                        











  01/03/21 01/03/21 01/04/21





  08:35 11:30 05:30


 


WBC   


 


RBC   


 


Hgb   


 


Hct   


 


MCV   


 


MCH   


 


MCHC   


 


RDW   


 


Plt Count   


 


Carbonic Acid   1.75 H 


 


HCO3/H2CO3 Ratio   14:1 


 


ABG pH   7.27 L 


 


ABG pCO2   58.0 H 


 


ABG pO2   40.9 L* 


 


ABG HCO3   25.7 H 


 


ABG O2 Saturation   67.9 L 


 


ABG Base Excess   -1.9 


 


FiO2   100% 


 


Sodium   


 


Potassium   


 


Chloride   


 


Carbon Dioxide   


 


Anion Gap   


 


BUN   


 


Creatinine   


 


Est GFR (African Amer)   


 


Glucose   


 


Calcium   


 


Phosphorus   


 


Magnesium   


 


Total Bilirubin   


 


AST   


 


Alkaline Phosphatase   


 


Total Protein   


 


Albumin   


 


Prealbumin   


 


Urine Color  YELLOW   CHOLO


 


Urine Appearance  CLEAR   CLOUDY


 


Urine pH  5.0   5.0


 


Ur Specific Gravity  1.023   1.017


 


Urine Protein  30 H   30 H


 


Urine Glucose (UA)  >=500 H   50 H


 


Urine Ketones  NEGATIVE   NEGATIVE


 


Urine Blood  LARGE H   MODERATE H


 


Urine Nitrite  NEGATIVE   NEGATIVE


 


Ur Leukocyte Esterase  NEGATIVE   TRACE H


 


Urine WBC (Auto)  3   16


 


Urine RBC (Auto)  71   117














  01/04/21 01/04/21 01/04/21





  05:30 05:30 05:30


 


WBC   24.9 H 


 


RBC   3.60 L 


 


Hgb   10.1 L 


 


Hct   32.7 L 


 


MCV   91 


 


MCH   28.1 


 


MCHC   30.8 L 


 


RDW   13.3 


 


Plt Count   117 L 


 


Carbonic Acid    2.79 H


 


HCO3/H2CO3 Ratio    11:1


 


ABG pH    7.16 L*


 


ABG pCO2    92.7 H*


 


ABG pO2    60.9 L


 


ABG HCO3    32.5 H


 


ABG O2 Saturation    82.6 L


 


ABG Base Excess    1.6


 


FiO2    75%


 


Sodium  136.2 L  


 


Potassium  5.1 H  


 


Chloride  100  


 


Carbon Dioxide  31 H  


 


Anion Gap  5  


 


BUN  61 H  


 


Creatinine  1.60 H  


 


Est GFR ( Amer)  50 L  


 


Glucose  158 H  


 


Calcium  8.1 L  


 


Phosphorus  4.8 H  


 


Magnesium  3.1 H  


 


Total Bilirubin  0.4  


 


AST  33  


 


Alkaline Phosphatase  111  


 


Total Protein  5.1 L  


 


Albumin  2.2 L  


 


Prealbumin  9.0 L  


 


Urine Color   


 


Urine Appearance   


 


Urine pH   


 


Ur Specific Gravity   


 


Urine Protein   


 


Urine Glucose (UA)   


 


Urine Ketones   


 


Urine Blood   


 


Urine Nitrite   


 


Ur Leukocyte Esterase   


 


Urine WBC (Auto)   


 


Urine RBC (Auto)   








                                        











  01/03/21





  00:10


 


Troponin I  0.015











Impressions: 


                                        





Guidance Fluoroscopy  01/02/21 00:00


IMPRESSION:  SUCCESSFUL PLACEMENT OF A 5 FR DUAL LUMEN 43 CM PICC IN THE RIGHT 

BASILIC VEIN.


 








Interventional Vascular Procedure  01/02/21 00:00


IMPRESSION:  SUCCESSFUL PLACEMENT OF A 5 FR DUAL LUMEN 43 CM PICC IN THE RIGHT 

BASILIC VEIN.


 








PICC Line Insertion  01/02/21 00:00


IMPRESSION:  SUCCESSFUL PLACEMENT OF A 5 FR DUAL LUMEN 43 CM PICC IN THE RIGHT 

BASILIC VEIN.


 











All labs, radiographs, diagnostic studies and EKGs were personally reviewed: Yes


In addition, reports of radiographic and diagnostic studies were read: Yes





Assessment and Plan





- Diagnosis


(1) Acute hypoxemic respiratory failure


Is this a current diagnosis for this admission?: Yes   


Plan: 





* Return to supine at 1800 today.


* Follow-up ABG results.  Titrate ventilator settings based on ABG results.








(2) Pneumonia due to COVID-19 virus


Is this a current diagnosis for this admission?: Yes   


Plan: 





* Already treated with azithromycin and remdesivir.


* Currently on vitamin C, vitamin D3 and zinc sulfate.


* Continue Decadron.  











(3) Diabetes mellitus type 2 in nonobese


Is this a current diagnosis for this admission?: Yes   





(4) Hypothyroidism


Qualifiers: 


   Hypothyroidism type: unspecified   Qualified Code(s): E03.9 - Hypothyroidism,

unspecified   


Is this a current diagnosis for this admission?: Yes   





Critical Time


Critical Time (minutes): 45


Level of Care: ICU


-: 


1.  The care of a critical patient is a dynamic process.  This note is a 

representative synopsis but static in nature.  The timeframe for treatments 

given in order is not necessarily the actual time these treatments may have been

done.





2.  This patient requires critical care secondary to ongoing requirements for 

therapy not offered or safe outside the critical care environment.  Transfer to 

a lower level of care will result in altered life or limb morbidity and 

mortality.





3.  Multidisciplinary rounds completed.





4.  ABCDE bundle addressed.

## 2021-01-05 VITALS — SYSTOLIC BLOOD PRESSURE: 114 MMHG | DIASTOLIC BLOOD PRESSURE: 49 MMHG

## 2021-01-05 LAB
ABSOLUTE LYMPHOCYTES# (MANUAL): 2.2 10^3/UL (ref 0.5–4.7)
ABSOLUTE MONOCYTES # (MANUAL): 0.7 10^3/UL (ref 0.1–1.4)
ADD MANUAL DIFF: YES
ANION GAP SERPL CALC-SCNC: 6 MMOL/L (ref 5–19)
APTT BLD: 46.8 SEC (ref 23.5–35.8)
ARTERIAL BLOOD FIO2: (no result)
ARTERIAL BLOOD H2CO3: 1.8 MMOL/L (ref 1.05–1.35)
ARTERIAL BLOOD H2CO3: 1.94 MMOL/L (ref 1.05–1.35)
ARTERIAL BLOOD H2CO3: 2.09 MMOL/L (ref 1.05–1.35)
ARTERIAL BLOOD H2CO3: 2.19 MMOL/L (ref 1.05–1.35)
ARTERIAL BLOOD H2CO3: 2.26 MMOL/L (ref 1.05–1.35)
ARTERIAL BLOOD HCO3: 16 MMOL/L (ref 20–24)
ARTERIAL BLOOD HCO3: 17.7 MMOL/L (ref 20–24)
ARTERIAL BLOOD HCO3: 20.1 MMOL/L (ref 20–24)
ARTERIAL BLOOD HCO3: 26 MMOL/L (ref 20–24)
ARTERIAL BLOOD HCO3: 27.1 MMOL/L (ref 20–24)
ARTERIAL BLOOD PCO2: 59.8 MMHG (ref 35–45)
ARTERIAL BLOOD PCO2: 64.4 MMHG (ref 35–45)
ARTERIAL BLOOD PCO2: 69.4 MMHG (ref 35–45)
ARTERIAL BLOOD PCO2: 72.8 MMHG (ref 35–45)
ARTERIAL BLOOD PCO2: 75.1 MMHG (ref 35–45)
ARTERIAL BLOOD PH: 6.95 (ref 7.35–7.45)
ARTERIAL BLOOD PH: 7.06 (ref 7.35–7.45)
ARTERIAL BLOOD PH: 7.08 (ref 7.35–7.45)
ARTERIAL BLOOD PH: 7.19 (ref 7.35–7.45)
ARTERIAL BLOOD PH: 7.26 (ref 7.35–7.45)
ARTERIAL BLOOD PO2: 30.9 MMHG (ref 80–100)
ARTERIAL BLOOD PO2: 33.2 MMHG (ref 80–100)
ARTERIAL BLOOD PO2: 37.5 MMHG (ref 80–100)
ARTERIAL BLOOD PO2: 43.3 MMHG (ref 80–100)
ARTERIAL BLOOD PO2: 76.9 MMHG (ref 80–100)
ARTERIAL BLOOD TOTAL CO2: 18.3 MMOL/L (ref 23–27)
ARTERIAL BLOOD TOTAL CO2: 19.7 MMOL/L (ref 23–27)
ARTERIAL BLOOD TOTAL CO2: 22.2 MMOL/L (ref 23–27)
ARTERIAL BLOOD TOTAL CO2: 27.8 MMOL/L (ref 23–27)
ARTERIAL BLOOD TOTAL CO2: 29.4 MMOL/L (ref 23–27)
BASE EXCESS BLDA CALC-SCNC: -1.7 MMOL/L
BASE EXCESS BLDA CALC-SCNC: -12.5 MMOL/L
BASE EXCESS BLDA CALC-SCNC: -15.8 MMOL/L
BASE EXCESS BLDA CALC-SCNC: -2.1 MMOL/L
BASE EXCESS BLDA CALC-SCNC: -9.9 MMOL/L
BASOPHILS NFR BLD MANUAL: 0 % (ref 0–2)
BUN SERPL-MCNC: 65 MG/DL (ref 7–20)
CALCIUM: 7.9 MG/DL (ref 8.4–10.2)
CHLORIDE SERPL-SCNC: 98 MMOL/L (ref 98–107)
CO2 SERPL-SCNC: 29 MMOL/L (ref 22–30)
CRP SERPL-MCNC: 60.9 MG/L (ref ?–10)
D DIMER PPP FEU-MCNC: 7.65 UG/ML (ref 0–0.5)
DACRYOCYTES BLD QL SMEAR: SLIGHT
EOSINOPHIL NFR BLD MANUAL: 0 % (ref 0–6)
ERYTHROCYTE [DISTWIDTH] IN BLOOD BY AUTOMATED COUNT: 13.8 % (ref 11.5–14)
GLUCOSE SERPL-MCNC: 123 MG/DL (ref 75–110)
HCT VFR BLD CALC: 29.1 % (ref 37.9–51)
HGB BLD-MCNC: 9 G/DL (ref 13.5–17)
MCH RBC QN AUTO: 28.8 PG (ref 27–33.4)
MCHC RBC AUTO-ENTMCNC: 31.1 G/DL (ref 32–36)
MCV RBC AUTO: 93 FL (ref 80–97)
MONOCYTES % (MANUAL): 3 % (ref 3–13)
NEUTS BAND NFR BLD MANUAL: 1 % (ref 3–5)
OVALOCYTES BLD QL SMEAR: SLIGHT
PHOSPHATE SERPL-MCNC: 3.9 MG/DL (ref 2.5–4.5)
PLATELET # BLD: 106 10^3/UL (ref 150–450)
PLATELET COMMENT: (no result)
POIKILOCYTOSIS BLD QL SMEAR: SLIGHT
POTASSIUM SERPL-SCNC: 4.9 MMOL/L (ref 3.6–5)
RBC # BLD AUTO: 3.14 10^6/UL (ref 4.35–5.55)
SAO2 % BLDA: 31.3 % (ref 94–98)
SAO2 % BLDA: 41.7 % (ref 94–98)
SAO2 % BLDA: 50.5 % (ref 94–98)
SAO2 % BLDA: 66.3 % (ref 94–98)
SAO2 % BLDA: 93.1 % (ref 94–98)
SCHISTOCYTES BLD QL SMEAR: SLIGHT
SEGMENTED NEUTROPHILS % (MAN): 87 % (ref 42–78)
TOTAL CELLS COUNTED BLD: 100
TOXIC GRANULES BLD QL SMEAR: SLIGHT
VARIANT LYMPHS NFR BLD MANUAL: 9 % (ref 13–45)
WBC # BLD AUTO: 24.2 10^3/UL (ref 4–10.5)

## 2021-01-05 RX ADMIN — FENTANYL CITRATE PRN MLS/HR: 50 INJECTION INTRAVENOUS at 05:57

## 2021-01-05 RX ADMIN — VITAMIN D, TAB 1000IU (100/BT) SCH UNIT: 25 TAB at 10:29

## 2021-01-05 RX ADMIN — OXYCODONE HYDROCHLORIDE AND ACETAMINOPHEN SCH MG: 500 TABLET ORAL at 12:34

## 2021-01-05 RX ADMIN — FENTANYL CITRATE PRN MLS/HR: 50 INJECTION INTRAVENOUS at 18:21

## 2021-01-05 RX ADMIN — Medication SCH TAB: at 10:28

## 2021-01-05 RX ADMIN — DOCUSATE SODIUM SCH MG: 50 LIQUID ORAL at 18:32

## 2021-01-05 RX ADMIN — PROPOFOL PRN MLS/HR: 10 INJECTION, EMULSION INTRAVENOUS at 18:39

## 2021-01-05 RX ADMIN — DEXAMETHASONE SODIUM PHOSPHATE SCH MG: 10 INJECTION INTRAMUSCULAR; INTRAVENOUS at 10:35

## 2021-01-05 RX ADMIN — LEUCINE, PHENYLALANINE, LYSINE, METHIONINE, ISOLEUCINE, VALINE, HISTIDINE, THREONINE, TRYPTOPHAN, ALANINE, GLYCINE, ARGININE, PROLINE, SERINE, TYROSINE, DEXTROSE PRN MLS/HR: 365; 280; 290; 200; 300; 290; 240; 210; 90; 1035; 515; 575; 340; 250; 20; 20 INJECTION INTRAVENOUS at 15:47

## 2021-01-05 RX ADMIN — FENTANYL CITRATE PRN MLS/HR: 50 INJECTION INTRAVENOUS at 10:10

## 2021-01-05 RX ADMIN — NYSTATIN SCH UNIT: 100000 SUSPENSION ORAL at 18:30

## 2021-01-05 RX ADMIN — PROPOFOL PRN MLS/HR: 10 INJECTION, EMULSION INTRAVENOUS at 05:56

## 2021-01-05 RX ADMIN — LEVOTHYROXINE SODIUM SCH MG: 50 TABLET ORAL at 05:57

## 2021-01-05 RX ADMIN — INSULIN HUMAN PRN MLS/HR: 100 INJECTION, SOLUTION PARENTERAL at 03:51

## 2021-01-05 RX ADMIN — Medication SCH: at 15:47

## 2021-01-05 RX ADMIN — OXYCODONE HYDROCHLORIDE AND ACETAMINOPHEN SCH MG: 500 TABLET ORAL at 23:54

## 2021-01-05 RX ADMIN — NOREPINEPHRINE BITARTRATE PRN MLS/HR: 1 INJECTION, SOLUTION, CONCENTRATE INTRAVENOUS at 01:45

## 2021-01-05 RX ADMIN — INSULIN LISPRO SCH: 100 INJECTION, SOLUTION INTRAVENOUS; SUBCUTANEOUS at 18:21

## 2021-01-05 RX ADMIN — MINERAL OIL AND WHITE PETROLATUM SCH APPLIC: 150; 830 OINTMENT OPHTHALMIC at 10:27

## 2021-01-05 RX ADMIN — NYSTATIN SCH UNIT: 100000 SUSPENSION ORAL at 10:31

## 2021-01-05 RX ADMIN — NOREPINEPHRINE BITARTRATE PRN MLS/HR: 1 INJECTION, SOLUTION, CONCENTRATE INTRAVENOUS at 21:48

## 2021-01-05 RX ADMIN — OXYCODONE HYDROCHLORIDE AND ACETAMINOPHEN SCH MG: 500 TABLET ORAL at 18:37

## 2021-01-05 RX ADMIN — MINERAL OIL AND WHITE PETROLATUM SCH APPLIC: 150; 830 OINTMENT OPHTHALMIC at 23:55

## 2021-01-05 RX ADMIN — FENTANYL CITRATE PRN MLS/HR: 50 INJECTION INTRAVENOUS at 03:51

## 2021-01-05 RX ADMIN — Medication SCH: at 05:26

## 2021-01-05 RX ADMIN — NYSTATIN SCH UNIT: 100000 SUSPENSION ORAL at 15:47

## 2021-01-05 RX ADMIN — Medication SCH: at 23:54

## 2021-01-05 RX ADMIN — HEPARIN SODIUM PRN MLS/HR: 10000 INJECTION, SOLUTION INTRAVENOUS at 10:14

## 2021-01-05 RX ADMIN — INSULIN HUMAN PRN MLS/HR: 100 INJECTION, SOLUTION PARENTERAL at 10:10

## 2021-01-05 RX ADMIN — NYSTATIN SCH: 100000 SUSPENSION ORAL at 23:54

## 2021-01-05 RX ADMIN — FENTANYL CITRATE PRN MLS/HR: 50 INJECTION INTRAVENOUS at 22:19

## 2021-01-05 RX ADMIN — NOREPINEPHRINE BITARTRATE PRN MLS/HR: 1 INJECTION, SOLUTION, CONCENTRATE INTRAVENOUS at 09:00

## 2021-01-05 RX ADMIN — Medication SCH: at 10:40

## 2021-01-05 RX ADMIN — DOCUSATE SODIUM SCH MG: 50 LIQUID ORAL at 10:31

## 2021-01-05 RX ADMIN — OXYCODONE HYDROCHLORIDE AND ACETAMINOPHEN SCH MG: 500 TABLET ORAL at 05:56

## 2021-01-05 RX ADMIN — FENTANYL CITRATE PRN MLS/HR: 50 INJECTION INTRAVENOUS at 15:10

## 2021-01-05 NOTE — RADIOLOGY REPORT (SQ)
EXAM DESCRIPTION: 



XR CHEST 1 VIEW. 8:20 PM.



COMPLETED DATE/TME:  01/05/2021 20:35



CLINICAL HISTORY: 



85 years, Male, RESPIRATORY DISTRESS



COMPARISON:

Film today at 5:50 AM. Film from 1/1/2021..



NUMBER OF VIEWS:

One



TECHNIQUE:

Portable chest x-ray





FINDINGS:



New endotracheal tube well above the ashish. PICC line in the

superior vena cava. Bilateral groundglass infiltrates improved

since 1/1/2021. No suspicious pleural effusion or pneumothorax.

## 2021-01-05 NOTE — RADIOLOGY REPORT (SQ)
EXAM DESCRIPTION:  CHEST SINGLE VIEW



IMAGES COMPLETED DATE/TIME:  1/5/2021 7:15 am



REASON FOR STUDY:  ETT tube



COMPARISON:  1/3/2021.



EXAM PARAMETERS:  NUMBER OF VIEWS: One view.

TECHNIQUE: Single frontal radiographic view of the chest acquired.

RADIATION DOSE: NA

LIMITATIONS: None.



FINDINGS:  LUNGS AND PLEURA: Bilateral airspace disease.  Possible slight improvement in the right shira
ng.

MEDIASTINUM AND HILAR STRUCTURES: No masses.  Contour normal.

HEART AND VASCULAR STRUCTURES: Heart normal in size.  Normal vasculature.

BONES: No acute findings.

HARDWARE: Stable tracheostomy tube, nasogastric tube, and PICC line.

OTHER: No other significant finding.



IMPRESSION:  BILATERAL AIRSPACE DISEASE.  SLIGHT IMPROVEMENT IN THE RIGHT LUNG.



TECHNICAL DOCUMENTATION:  JOB ID:  [id number]

 [employer]- All Rights Reserved



Reading location - IP/workstation name: [email or web address]

## 2021-01-05 NOTE — RADIOLOGY REPORT (SQ)
EXAM DESCRIPTION:  CHEST SINGLE VIEW



IMAGES COMPLETED DATE/TIME:  1/5/2021 5:23 pm



REASON FOR STUDY:  worsening hypoxia



COMPARISON:  1/5/2021



EXAM PARAMETERS:  NUMBER OF VIEWS: One view.

TECHNIQUE: Single frontal radiographic view of the chest acquired.

RADIATION DOSE: NA

LIMITATIONS: None.



FINDINGS:  LUNGS AND PLEURA:  Persistent bilateral mixed airspace and interstitial disease with some 
progression since the prior examination.  Bilateral small pleural effusions appears slightly larger, 
more so on the left.  No pneumothorax.

MEDIASTINUM AND HILAR STRUCTURES:  Stable appearance.

HEART AND VASCULAR STRUCTURES:  Stable appearance.

BONES: No acute findings.

HARDWARE:  Endotracheal tube, nasogastric tube and right PICC line are again identified.

OTHER: No other significant finding.



IMPRESSION:  1.  Persistent bilateral mixed airspace and interstitial disease with some progression s
juan the prior examination performed earlier on the same date 1/5/2021.  Small bilateral pleural effu
sions slightly increased, larger on the left.



TECHNICAL DOCUMENTATION:  JOB ID:  [id number]

 2011 [employer]- All Rights Reserved



Reading location - IP/workstation name: [email or web address]

## 2021-01-05 NOTE — PDOC CRITICAL CARE PROG REPORT
General


Date:: 01/05/21


ICU Day:: 4


Ventilator Day:: 3


Hospital Day:: 11


Resuscitation Status: Full Code


Events in the past 12 to 24 Hours:: 





Increasing hypoxia, possible need for ICU.





1/3/21: Intubated for increased WOB. Lower O2 saturations and decreasing mental 

status.





1/4: Remains intubated.  proned @ 0200.  on norepinephrine, weaning.  On 

propofol/fentanyl for sedation.  Got intermittent doses of rocuronium overnight.

 Afebrile overnight.  WBC 24.9, increasing.  On steroids.  On heparin.  on 

insulin.





1/5: Remains intubated.  Supine.  On norepinephrine @ 7 mcg/min.  Overnight, was

reported to have an episode of atrial fibrillation with rapid ventricular 

response, heart rate 190s.  WBC 24.9>23.3>24.2.  Nurse raises concern about 

patient being on insulin infusion and fingerstick glucose trending toward the 

lower range of normal.


Review of systems relevant to events:: 





Pulmonary


Reason for ICU Addmission:: Intubated for Covid PNA.





- Medications:


Medications reviewed and adjusted accordingly: Yes


Vasopressors:: 





norepinephrine


Sedation:: 





Propofol





Physical Exam


Vital Signs: 


                                        











Temp Pulse Resp BP Pulse Ox


 


 97.6 F   81   30 H  126/56 H  91 L


 


 01/04/21 20:14  01/05/21 08:00  01/05/21 08:00  01/05/21 08:00  01/05/21 08:00








                                 Intake & Output











 01/04/21 01/05/21 01/06/21





 06:59 06:59 06:59


 


Intake Total 1979 1547 250


 


Output Total 610 710 


 


Balance 1369 837 250


 


Weight 69.8 kg 75.1 kg 








                                  Weight/Height





Weight                           75.1 kg


Height                           1.65 m








General appearance: PRESENT: no acute distress, well-developed, well-nourished


Head exam: PRESENT: atraumatic, normocephalic


Eye exam: PRESENT: conjunctiva pink, EOMI, PERRLA.  ABSENT: scleral icterus


Mouth exam: PRESENT: moist, tongue midline


Respiratory exam: PRESENT: decreased breath sounds, symmetrical.  ABSENT: rales,

rhonchi, wheezes


Cardiovascular exam: PRESENT: irregular rhythm, tachycardia.  ABSENT: diastolic 

murmur, rubs, systolic murmur


Pulses: PRESENT: normal dorsalis pedis pul


GI/Abdominal exam: PRESENT: normal bowel sounds, soft.  ABSENT: distended, 

guarding, mass, organolmegaly, rebound, tenderness


Gentrourinary exam: PRESENT: indwelling catheter


Extremities exam: PRESENT: full ROM.  ABSENT: calf tenderness, clubbing, pedal 

edema


Neurological exam: PRESENT: altered, CN II-XII grossly intact.  ABSENT: reflexes

normal


Psychiatric exam: ABSENT: agitated, anxious


Skin exam: PRESENT: dry, intact, warm.  ABSENT: cyanosis, rash


Tubes/Lines: PRESENT: Endotracheal Tube, Arterial Catheter, Other - Orogastric





Laboratory/Radiographs


Laboratory Results: 


                                        





                                 01/05/21 04:00 





                                 01/05/21 04:00 





                                        











  01/04/21 01/04/21 01/04/21





  12:05 14:15 20:40


 


WBC   23.3 H 


 


RBC   3.25 L 


 


Hgb   9.5 L 


 


Hct   29.6 L 


 


MCV   91 


 


MCH   29.3 


 


MCHC   32.2 


 


RDW   13.4 


 


Plt Count   107 L 


 


Seg Neutrophils %   


 


Carbonic Acid  2.01 H  


 


HCO3/H2CO3 Ratio  14:1  


 


ABG pH  7.26 L  


 


ABG pCO2  66.8 H  


 


ABG pO2  66.2 L  


 


ABG HCO3  29.5 H  


 


ABG O2 Saturation  89.6 L  


 


ABG Base Excess  1.3  


 


FiO2  100%  


 


Sodium   


 


Potassium   


 


Chloride   


 


Carbon Dioxide   


 


Anion Gap   


 


BUN   


 


Creatinine   


 


Est GFR (African Amer)   


 


Glucose   


 


Calcium   


 


Phosphorus   


 


Magnesium   


 


C-Reactive Protein   


 


Triglycerides    155 H














  01/05/21 01/05/21 01/05/21





  04:00 04:00 04:00


 


WBC  24.2 H  


 


RBC  3.14 L  


 


Hgb  9.0 L  


 


Hct  29.1 L  


 


MCV  93  


 


MCH  28.8  


 


MCHC  31.1 L  


 


RDW  13.8  


 


Plt Count  106 L  


 


Seg Neutrophils %  Not Reportable  


 


Carbonic Acid    1.80 H


 


HCO3/H2CO3 Ratio    14:1


 


ABG pH    7.26 L


 


ABG pCO2    59.8 H


 


ABG pO2    76.9 L


 


ABG HCO3    26.0 H


 


ABG O2 Saturation    93.1 L


 


ABG Base Excess    -1.7


 


FiO2    100%


 


Sodium   132.5 L 


 


Potassium   4.9 


 


Chloride   98 


 


Carbon Dioxide   29 


 


Anion Gap   6 


 


BUN   65 H 


 


Creatinine   2.05 H 


 


Est GFR ( Amer)   38 L 


 


Glucose   123 H 


 


Calcium   7.9 L 


 


Phosphorus   3.9 


 


Magnesium   3.0 H 


 


C-Reactive Protein   60.9 H 


 


Triglycerides   








                                        











  01/03/21





  00:10


 


Troponin I  0.015











Impressions: 


                                        





Guidance Fluoroscopy  01/02/21 00:00


IMPRESSION:  SUCCESSFUL PLACEMENT OF A 5 FR DUAL LUMEN 43 CM PICC IN THE RIGHT 

BASILIC VEIN.


 








Interventional Vascular Procedure  01/02/21 00:00


IMPRESSION:  SUCCESSFUL PLACEMENT OF A 5 FR DUAL LUMEN 43 CM PICC IN THE RIGHT 

BASILIC VEIN.


 








PICC Line Insertion  01/02/21 00:00


IMPRESSION:  SUCCESSFUL PLACEMENT OF A 5 FR DUAL LUMEN 43 CM PICC IN THE RIGHT 

BASILIC VEIN.


 








Chest X-Ray  01/05/21 05:00


IMPRESSION:  BILATERAL AIRSPACE DISEASE.  SLIGHT IMPROVEMENT IN THE RIGHT LUNG.


 











All labs, radiographs, diagnostic studies and EKGs were personally reviewed: Yes


In addition, reports of radiographic and diagnostic studies were read: Yes





Assessment and Plan





- Diagnosis


(1) Acute hypoxemic respiratory failure


Is this a current diagnosis for this admission?: Yes   


Plan: 





* Return to supine at 1800 today.


* Carn[place] of PC mode ventilation. f 30, 16+PEEP/14, 100%, Ti 1.05.


* check proBNP.








(2) Pneumonia due to COVID-19 virus


Is this a current diagnosis for this admission?: Yes   


Plan: 





* Already treated with azithromycin and remdesivir.


* Currently on vitamin C, vitamin D3 and zinc sulfate.


* Continue Decadron.  











(3) Acute kidney injury


Is this a current diagnosis for this admission?: Yes   


Plan: 





* Monitor urine output.


* Trial dose of furosemide 40 mg IV (single dose).


* Renal dosing of medications.


* Avoid nephrotoxic drugs.








(4) Diabetes mellitus type 2 in nonobese


Is this a current diagnosis for this admission?: Yes   


Plan: 





* At this point, optimally controlled.


* Stop insulin infusion.


* Start sliding scale insulin coverage.  


* Accu-Cheks every 6.











(5) Hypothyroidism


Qualifiers: 


   Hypothyroidism type: unspecified   Qualified Code(s): E03.9 - Hypothyroidism,

unspecified   


Is this a current diagnosis for this admission?: Yes   





Critical Time


Critical Time (minutes): 45


Level of Care: ICU


-: 


1.  The care of a critical patient is a dynamic process.  This note is a repres

entative synopsis but static in nature.  The timeframe for treatments given in 

order is not necessarily the actual time these treatments may have been done.





2.  This patient requires critical care secondary to ongoing requirements for 

therapy not offered or safe outside the critical care environment.  Transfer to 

a lower level of care will result in altered life or limb morbidity and 

mortality.





3.  Multidisciplinary rounds completed.





4.  ABCDE bundle addressed.

## 2021-01-06 LAB
ABSOLUTE LYMPHOCYTES# (MANUAL): 4.7 10^3/UL (ref 0.5–4.7)
ABSOLUTE MONOCYTES # (MANUAL): 1.3 10^3/UL (ref 0.1–1.4)
ADD MANUAL DIFF: YES
ALBUMIN SERPL-MCNC: 1.9 G/DL (ref 3.5–5)
ALP SERPL-CCNC: 69 U/L (ref 38–126)
ANION GAP SERPL CALC-SCNC: 17 MMOL/L (ref 5–19)
ANISOCYTOSIS BLD QL SMEAR: SLIGHT
APPEARANCE UR: (no result)
APTT PPP: (no result) S
ARTERIAL BLOOD FIO2: (no result)
ARTERIAL BLOOD H2CO3: 2.08 MMOL/L (ref 1.05–1.35)
ARTERIAL BLOOD H2CO3: 2.15 MMOL/L (ref 1.05–1.35)
ARTERIAL BLOOD H2CO3: 2.69 MMOL/L (ref 1.05–1.35)
ARTERIAL BLOOD HCO3: 15.2 MMOL/L (ref 20–24)
ARTERIAL BLOOD HCO3: 22.4 MMOL/L (ref 20–24)
ARTERIAL BLOOD HCO3: 25.5 MMOL/L (ref 20–24)
ARTERIAL BLOOD PCO2: 69.2 MMHG (ref 35–45)
ARTERIAL BLOOD PCO2: 71.5 MMHG (ref 35–45)
ARTERIAL BLOOD PCO2: 89.3 MMHG (ref 35–45)
ARTERIAL BLOOD PH: 6.95 (ref 7.35–7.45)
ARTERIAL BLOOD PH: 7.07 (ref 7.35–7.45)
ARTERIAL BLOOD PH: 7.13 (ref 7.35–7.45)
ARTERIAL BLOOD PO2: 37 MMHG (ref 80–100)
ARTERIAL BLOOD PO2: 39.2 MMHG (ref 80–100)
ARTERIAL BLOOD PO2: 48.5 MMHG (ref 80–100)
ARTERIAL BLOOD TOTAL CO2: 17.4 MMOL/L (ref 23–27)
ARTERIAL BLOOD TOTAL CO2: 24.5 MMOL/L (ref 23–27)
ARTERIAL BLOOD TOTAL CO2: 28.2 MMOL/L (ref 23–27)
AST SERPL-CCNC: 991 U/L (ref 17–59)
BASE EXCESS BLDA CALC-SCNC: -16.9 MMOL/L
BASE EXCESS BLDA CALC-SCNC: -5.7 MMOL/L
BASE EXCESS BLDA CALC-SCNC: -7.1 MMOL/L
BASOPHILS NFR BLD MANUAL: 0 % (ref 0–2)
BILIRUB DIRECT SERPL-MCNC: 0.8 MG/DL (ref 0–0.4)
BILIRUB SERPL-MCNC: 1.2 MG/DL (ref 0.2–1.3)
BILIRUB UR QL STRIP: NEGATIVE
BUN SERPL-MCNC: 61 MG/DL (ref 7–20)
CALCIUM: 6.9 MG/DL (ref 8.4–10.2)
CHLORIDE SERPL-SCNC: 95 MMOL/L (ref 98–107)
CO2 SERPL-SCNC: 18 MMOL/L (ref 22–30)
EOSINOPHIL NFR BLD MANUAL: 0 % (ref 0–6)
ERYTHROCYTE [DISTWIDTH] IN BLOOD BY AUTOMATED COUNT: 14.6 % (ref 11.5–14)
GLUCOSE SERPL-MCNC: 330 MG/DL (ref 75–110)
GLUCOSE UR STRIP-MCNC: 50 MG/DL
HCT VFR BLD CALC: 29.4 % (ref 37.9–51)
HGB BLD-MCNC: 8.5 G/DL (ref 13.5–17)
KETONES UR STRIP-MCNC: NEGATIVE MG/DL
MCH RBC QN AUTO: 28.7 PG (ref 27–33.4)
MCHC RBC AUTO-ENTMCNC: 28.9 G/DL (ref 32–36)
MCV RBC AUTO: 99 FL (ref 80–97)
MONOCYTES % (MANUAL): 5 % (ref 3–13)
NEUTS BAND NFR BLD MANUAL: 5 % (ref 3–5)
NITRITE UR QL STRIP: NEGATIVE
NRBC BLD AUTO-RTO: 2 /100 WBC
PH UR STRIP: 5 [PH] (ref 5–9)
PHOSPHATE SERPL-MCNC: 6.7 MG/DL (ref 2.5–4.5)
PLATELET # BLD: 110 10^3/UL (ref 150–450)
PLATELET COMMENT: (no result)
POLYCHROMASIA BLD QL SMEAR: SLIGHT
POTASSIUM SERPL-SCNC: 5.4 MMOL/L (ref 3.6–5)
PROT SERPL-MCNC: 4.4 G/DL (ref 6.3–8.2)
PROT UR STRIP-MCNC: 30 MG/DL
RBC # BLD AUTO: 2.96 10^6/UL (ref 4.35–5.55)
SAO2 % BLDA: 45.2 % (ref 94–98)
SAO2 % BLDA: 52.6 % (ref 94–98)
SAO2 % BLDA: 66 % (ref 94–98)
SEGMENTED NEUTROPHILS % (MAN): 72 % (ref 42–78)
SP GR UR STRIP: 1.01
TOTAL CELLS COUNTED BLD: 100
TOXIC GRANULES BLD QL SMEAR: (no result)
UROBILINOGEN UR-MCNC: NEGATIVE MG/DL (ref ?–2)
VARIANT LYMPHS NFR BLD MANUAL: 18 % (ref 13–45)
WBC # BLD AUTO: 26.3 10^3/UL (ref 4–10.5)
WBC TOXIC VACUOLES BLD QL SMEAR: PRESENT

## 2021-01-06 RX ADMIN — NOREPINEPHRINE BITARTRATE PRN MLS/HR: 1 INJECTION, SOLUTION, CONCENTRATE INTRAVENOUS at 09:10

## 2021-01-06 RX ADMIN — NOREPINEPHRINE BITARTRATE PRN MLS/HR: 1 INJECTION, SOLUTION, CONCENTRATE INTRAVENOUS at 09:54

## 2021-01-06 RX ADMIN — NOREPINEPHRINE BITARTRATE PRN MLS/HR: 1 INJECTION, SOLUTION, CONCENTRATE INTRAVENOUS at 19:00

## 2021-01-06 RX ADMIN — FENTANYL CITRATE PRN MLS/HR: 50 INJECTION INTRAVENOUS at 03:30

## 2021-01-06 RX ADMIN — NYSTATIN SCH: 100000 SUSPENSION ORAL at 19:23

## 2021-01-06 RX ADMIN — MINERAL OIL AND WHITE PETROLATUM SCH APPLIC: 150; 830 OINTMENT OPHTHALMIC at 21:40

## 2021-01-06 RX ADMIN — LEVOTHYROXINE SODIUM SCH MG: 50 TABLET ORAL at 06:40

## 2021-01-06 RX ADMIN — NOREPINEPHRINE BITARTRATE PRN MLS/HR: 1 INJECTION, SOLUTION, CONCENTRATE INTRAVENOUS at 00:02

## 2021-01-06 RX ADMIN — VITAMIN D, TAB 1000IU (100/BT) SCH: 25 TAB at 11:10

## 2021-01-06 RX ADMIN — Medication SCH TAB: at 11:10

## 2021-01-06 RX ADMIN — NOREPINEPHRINE BITARTRATE PRN MLS/HR: 1 INJECTION, SOLUTION, CONCENTRATE INTRAVENOUS at 02:16

## 2021-01-06 RX ADMIN — DOCUSATE SODIUM SCH MG: 50 LIQUID ORAL at 11:05

## 2021-01-06 RX ADMIN — NOREPINEPHRINE BITARTRATE PRN MLS/HR: 1 INJECTION, SOLUTION, CONCENTRATE INTRAVENOUS at 12:03

## 2021-01-06 RX ADMIN — SODIUM BICARBONATE PRN MLS/HR: 84 INJECTION INTRAVENOUS at 00:15

## 2021-01-06 RX ADMIN — Medication SCH: at 06:03

## 2021-01-06 RX ADMIN — INSULIN LISPRO SCH UNIT: 100 INJECTION, SOLUTION INTRAVENOUS; SUBCUTANEOUS at 12:09

## 2021-01-06 RX ADMIN — INSULIN LISPRO SCH UNIT: 100 INJECTION, SOLUTION INTRAVENOUS; SUBCUTANEOUS at 00:00

## 2021-01-06 RX ADMIN — SODIUM BICARBONATE PRN MLS/HR: 84 INJECTION INTRAVENOUS at 09:11

## 2021-01-06 RX ADMIN — NOREPINEPHRINE BITARTRATE PRN MLS/HR: 1 INJECTION, SOLUTION, CONCENTRATE INTRAVENOUS at 23:30

## 2021-01-06 RX ADMIN — NYSTATIN SCH UNIT: 100000 SUSPENSION ORAL at 21:40

## 2021-01-06 RX ADMIN — OXYCODONE HYDROCHLORIDE AND ACETAMINOPHEN SCH MG: 500 TABLET ORAL at 06:40

## 2021-01-06 RX ADMIN — FENTANYL CITRATE PRN MLS/HR: 50 INJECTION INTRAVENOUS at 08:25

## 2021-01-06 RX ADMIN — NYSTATIN SCH: 100000 SUSPENSION ORAL at 15:34

## 2021-01-06 RX ADMIN — NOREPINEPHRINE BITARTRATE PRN MLS/HR: 1 INJECTION, SOLUTION, CONCENTRATE INTRAVENOUS at 14:27

## 2021-01-06 RX ADMIN — HEPARIN SODIUM PRN MLS/HR: 10000 INJECTION, SOLUTION INTRAVENOUS at 10:54

## 2021-01-06 RX ADMIN — INSULIN LISPRO SCH UNIT: 100 INJECTION, SOLUTION INTRAVENOUS; SUBCUTANEOUS at 18:41

## 2021-01-06 RX ADMIN — Medication SCH: at 21:09

## 2021-01-06 RX ADMIN — PROPOFOL PRN MLS/HR: 10 INJECTION, EMULSION INTRAVENOUS at 18:31

## 2021-01-06 RX ADMIN — Medication SCH: at 15:34

## 2021-01-06 RX ADMIN — Medication SCH MG: at 21:40

## 2021-01-06 RX ADMIN — Medication SCH: at 00:01

## 2021-01-06 RX ADMIN — OXYCODONE HYDROCHLORIDE AND ACETAMINOPHEN SCH: 500 TABLET ORAL at 19:23

## 2021-01-06 RX ADMIN — OXYCODONE HYDROCHLORIDE AND ACETAMINOPHEN SCH MG: 500 TABLET ORAL at 12:09

## 2021-01-06 RX ADMIN — NOREPINEPHRINE BITARTRATE PRN MLS/HR: 1 INJECTION, SOLUTION, CONCENTRATE INTRAVENOUS at 16:42

## 2021-01-06 RX ADMIN — DOCUSATE SODIUM SCH: 50 LIQUID ORAL at 19:23

## 2021-01-06 RX ADMIN — NYSTATIN SCH UNIT: 100000 SUSPENSION ORAL at 11:02

## 2021-01-06 RX ADMIN — INSULIN LISPRO SCH UNIT: 100 INJECTION, SOLUTION INTRAVENOUS; SUBCUTANEOUS at 06:40

## 2021-01-06 RX ADMIN — DEXAMETHASONE SODIUM PHOSPHATE SCH MG: 10 INJECTION INTRAMUSCULAR; INTRAVENOUS at 10:54

## 2021-01-06 RX ADMIN — MINERAL OIL AND WHITE PETROLATUM SCH APPLIC: 150; 830 OINTMENT OPHTHALMIC at 11:01

## 2021-01-06 RX ADMIN — Medication SCH: at 11:05

## 2021-01-06 RX ADMIN — NOREPINEPHRINE BITARTRATE PRN MLS/HR: 1 INJECTION, SOLUTION, CONCENTRATE INTRAVENOUS at 21:15

## 2021-01-06 RX ADMIN — SODIUM BICARBONATE PRN MLS/HR: 84 INJECTION INTRAVENOUS at 16:52

## 2021-01-06 RX ADMIN — NOREPINEPHRINE BITARTRATE PRN MLS/HR: 1 INJECTION, SOLUTION, CONCENTRATE INTRAVENOUS at 04:30

## 2021-01-06 NOTE — PDOC CRITICAL CARE PROG REPORT
General


Date:: 01/06/21


ICU Day:: 5


Ventilator Day:: 4


Hospital Day:: 12


Resuscitation Status: Full Code


Events in the past 12 to 24 Hours:: 





Increasing hypoxia, possible need for ICU.





1/3/21: Intubated for increased WOB. Lower O2 saturations and decreasing mental 

status.





1/4: Remains intubated.  proned @ 0200.  on norepinephrine, weaning.  On 

propofol/fentanyl for sedation.  Got intermittent doses of rocuronium overnight.

 Afebrile overnight.  WBC 24.9, increasing.  On steroids.  On heparin.  on 

insulin.





1/5: Remains intubated.  Supine.  On norepinephrine @ 7 mcg/min.  Overnight, was

reported to have an episode of atrial fibrillation with rapid ventricular 

response, heart rate 190s.  WBC 24.9>23.3>24.2.  Nurse raises concern about 

patient being on insulin infusion and fingerstick glucose trending toward the 

lower range of normal.





1/6: Remains intubated.  Had an episode of profound oxygen desaturation 

yesterday at the end of day shift, which required bag ventilation.  This episode

was associated with profound hypotension.  Responsive to straight leg raising 

test.  The patient was given IV fluid boluses throughout the night.  

Nonetheless, he is now on Levophed and vasopressin at maximum doses.  He is off 

propofol (due to hypotension).  Continues on fentanyl.  Had another episode of 

hypoxia and hypotension overnight.  Labs this morning show interval worsening in

renal function and liver function compatible with shock with multiorgan failure.

 Blood cultures are isolating gram-positive cocci in clusters.


Review of systems relevant to events:: 





Pulmonary


Reason for ICU Addmission:: Intubated for Covid PNA.





- Medications:


Medications reviewed and adjusted accordingly: Yes


Vasopressors:: 





norepinephrine


Sedation:: 





Propofol





Physical Exam


Vital Signs: 


                                        











Temp Pulse Resp BP Pulse Ox


 


 97.7 F   128 H  13   114/49 L  80 L


 


 01/06/21 10:00  01/05/21 19:00  01/06/21 12:45  01/05/21 18:00  01/06/21 13:10








                                 Intake & Output











 01/05/21 01/06/21 01/07/21





 06:59 06:59 06:59


 


Intake Total 1547 1750 1780


 


Output Total 663 317 90


 


Balance 837 1433 1690


 


Weight 75.1 kg 86.2 kg 








                                  Weight/Height





Weight                           86.2 kg


Height                           1.65 m











Laboratory/Radiographs


Laboratory Results: 


                                        





                                 01/06/21 04:02 





                                 01/06/21 04:02 





                                        











  01/05/21 01/05/21 01/05/21





  20:13 21:23 21:35


 


WBC   


 


RBC   


 


Hgb   


 


Hct   


 


MCV   


 


MCH   


 


MCHC   


 


RDW   


 


Plt Count   


 


Seg Neutrophils %   


 


Carbonic Acid  2.09 H   1.94 H


 


HCO3/H2CO3 Ratio  9:1   9:1


 


ABG pH  7.08 L*   7.06 L*


 


ABG pCO2  69.4 H*   64.4 H


 


ABG pO2  37.5 L*   33.2 L*


 


ABG HCO3  20.1   17.7 L


 


ABG O2 Saturation  50.5 L   41.7 L


 


ABG Base Excess  -9.9   -12.5


 


FiO2  100%   100%


 


Sodium   


 


Potassium   


 


Chloride   


 


Carbon Dioxide   


 


Anion Gap   


 


BUN   


 


Creatinine   


 


Est GFR (African Amer)   


 


Glucose   


 


Lactic Acid   8.1 H 


 


Calcium   


 


Phosphorus   


 


Magnesium   


 


Total Bilirubin   


 


AST   


 


Alkaline Phosphatase   


 


Total Protein   


 


Albumin   


 


Urine Color   


 


Urine Appearance   


 


Urine pH   


 


Ur Specific Gravity   


 


Urine Protein   


 


Urine Glucose (UA)   


 


Urine Ketones   


 


Urine Blood   


 


Urine Nitrite   


 


Ur Leukocyte Esterase   


 


Urine WBC (Auto)   


 


Urine RBC (Auto)   














  01/05/21 01/06/21 01/06/21





  23:00 04:02 04:02


 


WBC    26.3 H


 


RBC    2.96 L


 


Hgb    8.5 L


 


Hct    29.4 L


 


MCV    99 H D


 


MCH    28.7


 


MCHC    28.9 L


 


RDW    14.6 H


 


Plt Count    110 L


 


Seg Neutrophils %    Not Reportable


 


Carbonic Acid  2.26 H  


 


HCO3/H2CO3 Ratio  7:1  


 


ABG pH  6.95 L*  


 


ABG pCO2  75.1 H*  


 


ABG pO2  30.9 L*  


 


ABG HCO3  16.0 L  


 


ABG O2 Saturation  31.3 L  


 


ABG Base Excess  -15.8  


 


FiO2  100%  


 


Sodium   


 


Potassium   


 


Chloride   


 


Carbon Dioxide   


 


Anion Gap   


 


BUN   


 


Creatinine   


 


Est GFR (African Amer)   


 


Glucose   


 


Lactic Acid   


 


Calcium   


 


Phosphorus   


 


Magnesium   


 


Total Bilirubin   


 


AST   


 


Alkaline Phosphatase   


 


Total Protein   


 


Albumin   


 


Urine Color   DARK YELLOW 


 


Urine Appearance   CLOUDY 


 


Urine pH   5.0 


 


Ur Specific Gravity   1.013 


 


Urine Protein   30 H 


 


Urine Glucose (UA)   50 H 


 


Urine Ketones   NEGATIVE 


 


Urine Blood   MODERATE H 


 


Urine Nitrite   NEGATIVE 


 


Ur Leukocyte Esterase   NEGATIVE 


 


Urine WBC (Auto)   6 


 


Urine RBC (Auto)   145 














  01/06/21 01/06/21 01/06/21





  04:02 04:02 04:02


 


WBC   


 


RBC   


 


Hgb   


 


Hct   


 


MCV   


 


MCH   


 


MCHC   


 


RDW   


 


Plt Count   


 


Seg Neutrophils %   


 


Carbonic Acid   2.15 H 


 


HCO3/H2CO3 Ratio   7:1 


 


ABG pH   6.95 L* 


 


ABG pCO2   71.5 H* 


 


ABG pO2   39.2 L* 


 


ABG HCO3   15.2 L 


 


ABG O2 Saturation   45.2 L 


 


ABG Base Excess   -16.9 


 


FiO2   100% 


 


Sodium  130.2 L  


 


Potassium  5.4 H  


 


Chloride  95 L  


 


Carbon Dioxide  18 L  


 


Anion Gap  17  


 


BUN  61 H  


 


Creatinine  2.85 H  


 


Est GFR ( Amer)  26 L  


 


Glucose  330 H  


 


Lactic Acid    13.4 H


 


Calcium  6.9 L*  


 


Phosphorus  6.7 H  


 


Magnesium  2.6 H  


 


Total Bilirubin  1.2  


 


AST  991 H  


 


Alkaline Phosphatase  69  


 


Total Protein  4.4 L  


 


Albumin  1.9 L  


 


Urine Color   


 


Urine Appearance   


 


Urine pH   


 


Ur Specific Gravity   


 


Urine Protein   


 


Urine Glucose (UA)   


 


Urine Ketones   


 


Urine Blood   


 


Urine Nitrite   


 


Ur Leukocyte Esterase   


 


Urine WBC (Auto)   


 


Urine RBC (Auto)   














  01/06/21





  10:35


 


WBC 


 


RBC 


 


Hgb 


 


Hct 


 


MCV 


 


MCH 


 


MCHC 


 


RDW 


 


Plt Count 


 


Seg Neutrophils % 


 


Carbonic Acid 


 


HCO3/H2CO3 Ratio 


 


ABG pH 


 


ABG pCO2 


 


ABG pO2 


 


ABG HCO3 


 


ABG O2 Saturation 


 


ABG Base Excess 


 


FiO2 


 


Sodium 


 


Potassium 


 


Chloride 


 


Carbon Dioxide 


 


Anion Gap 


 


BUN 


 


Creatinine 


 


Est GFR (African Amer) 


 


Glucose 


 


Lactic Acid  11.8 H


 


Calcium 


 


Phosphorus 


 


Magnesium 


 


Total Bilirubin 


 


AST 


 


Alkaline Phosphatase 


 


Total Protein 


 


Albumin 


 


Urine Color 


 


Urine Appearance 


 


Urine pH 


 


Ur Specific Gravity 


 


Urine Protein 


 


Urine Glucose (UA) 


 


Urine Ketones 


 


Urine Blood 


 


Urine Nitrite 


 


Ur Leukocyte Esterase 


 


Urine WBC (Auto) 


 


Urine RBC (Auto) 








                                        





01/05/21 17:30   Tracheal Aspirate   Gram Stain - Final





                                        











  01/03/21 01/05/21 01/06/21





  00:10 10:22 04:02


 


Troponin I  0.015  


 


NT-Pro-B Natriuret Pep   498 H  1260 H











Impressions: 


                                        





Guidance Fluoroscopy  01/02/21 00:00


IMPRESSION:  SUCCESSFUL PLACEMENT OF A 5 FR DUAL LUMEN 43 CM PICC IN THE RIGHT 

BASILIC VEIN.


 








Interventional Vascular Procedure  01/02/21 00:00


IMPRESSION:  SUCCESSFUL PLACEMENT OF A 5 FR DUAL LUMEN 43 CM PICC IN THE RIGHT 

BASILIC VEIN.


 








PICC Line Insertion  01/02/21 00:00


IMPRESSION:  SUCCESSFUL PLACEMENT OF A 5 FR DUAL LUMEN 43 CM PICC IN THE RIGHT 

BASILIC VEIN.


 








Chest X-Ray  01/05/21 16:43


IMPRESSION:  1.  Persistent bilateral mixed airspace and interstitial disease 

with some progression since the prior examination performed earlier on the same 

date 1/5/2021.  Small bilateral pleural effusions slightly increased, larger on 

the left.


 














Assessment and Plan





- Diagnosis


(1) Septic shock


Is this a current diagnosis for this admission?: Yes   





(2) Multiorgan failure


Is this a current diagnosis for this admission?: Yes   





(3) Acute hypoxemic respiratory failure


Is this a current diagnosis for this admission?: Yes   


Plan: 





* At this point, the patient has acute hypoxemic and hypercapnic respiratory 

  failure.  


* Titrate vent settings based on ABG results.








(4) Shock liver


Is this a current diagnosis for this admission?: Yes   





(5) Acute kidney injury


Is this a current diagnosis for this admission?: Yes   


Plan: 





* Monitor urine output.


* Renal dosing of medications.


* Avoid nephrotoxic drugs.








(6) Staphylococcus aureus bacteremia


Is this a current diagnosis for this admission?: Yes   


Plan: 





* Start vancomycin.








(7) Pneumonia due to COVID-19 virus


Is this a current diagnosis for this admission?: Yes   


Plan: 





* Already treated with azithromycin and remdesivir.


* Currently on vitamin C, vitamin D3 and zinc sulfate.


* Continue Decadron.  











(8) Diabetes mellitus type 2 in nonobese


Is this a current diagnosis for this admission?: Yes   





(9) Hypothyroidism


Qualifiers: 


   Hypothyroidism type: unspecified   Qualified Code(s): E03.9 - Hypothyroidism,

unspecified   


Is this a current diagnosis for this admission?: Yes   





Critical Time


Critical Time (minutes): 60


Level of Care: ICU


-: 


1.  The care of a critical patient is a dynamic process.  This note is a 

representative synopsis but static in nature.  The timeframe for treatments 

given in order is not necessarily the actual time these treatments may have been

done.





2.  This patient requires critical care secondary to ongoing requirements for 

therapy not offered or safe outside the critical care environment.  Transfer to 

a lower level of care will result in altered life or limb morbidity and 

mortality.





3.  Multidisciplinary rounds completed.





4.  ABCDE bundle addressed.

## 2021-01-07 RX ADMIN — NOREPINEPHRINE BITARTRATE PRN MLS/HR: 1 INJECTION, SOLUTION, CONCENTRATE INTRAVENOUS at 01:45

## 2021-01-07 NOTE — DEATH SUMMARY
Death Summary


Date : 21


Time of Death:: 02:01


Autopsy: No


Resuscitation Status: Full Code





- Final Diagnosis


(1) Pneumonia due to COVID-19 virus


Is this a current diagnosis for this admission?: Yes   





(2) Acute hypoxemic respiratory failure


Is this a current diagnosis for this admission?: Yes   


Hospital Course:: 


SERENITY DIAZ  85 year old male patient of Dr. Marino who presented to the ED 

with recent diagnosis of positive status for COVID-19 infection and worsening 

shortness of breath, particularly with exertion. Patient reported associated 

minimally productive cough, loss of smell and taste over last one week. He 

claimed associated worsening generalized weakness. He admitted exposure to his 

granddaughter was was recently diagnosed with corona virus infection. He denied 

any diarrhea or abdominal pain. No chest pain or palpitation. His initial ED 

evaluation was significant for elevation of inflammatory indices and D-Dimer, 

electrolytes derangement, and chest X ray suggestive of left lower lobe airspace

disease process. He was advised hospitalization for further evaluation and 

management





1/3/21: Intubated for increased WOB. Lower O2 saturations and decreasing mental 

status.





: Remains intubated.  proned @ 0200.  on norepinephrine, weaning.  On 

propofol/fentanyl for sedation.  Got intermittent doses of rocuronium overnight.

 Afebrile overnight.  WBC 24.9, increasing.  On steroids.  On heparin.  on 

insulin.





: Remains intubated.  Supine.  On norepinephrine @ 7 mcg/min.  Overnight, was

reported to have an episode of atrial fibrillation with rapid ventricular 

response, heart rate 190s.  WBC 24.9>23.3>24.2.  Nurse raises concern about 

patient being on insulin infusion and fingerstick glucose trending toward the 

lower range of normal.





: Remains intubated.  Had an episode of profound oxygen desaturation 

yesterday at the end of day shift, which required bag ventilation.  This episode

was associated with profound hypotension.  Responsive to straight leg raising 

test.  The patient was given IV fluid boluses throughout the night.  

Nonetheless, he is now on Levophed and vasopressin at maximum doses.  He is off 

propofol (due to hypotension).  Continues on fentanyl.  Had another episode of 

hypoxia and hypotension overnight.  Labs this morning show interval worsening in

renal function and liver function compatible with shock with multiorgan failure.

 Blood cultures are isolating gram-positive cocci in clusters.